# Patient Record
Sex: FEMALE | Race: WHITE | NOT HISPANIC OR LATINO | Employment: OTHER | URBAN - METROPOLITAN AREA
[De-identification: names, ages, dates, MRNs, and addresses within clinical notes are randomized per-mention and may not be internally consistent; named-entity substitution may affect disease eponyms.]

---

## 2024-01-12 ENCOUNTER — OFFICE VISIT (OUTPATIENT)
Dept: PRIMARY CARE | Facility: CLINIC | Age: 39
End: 2024-01-12
Payer: COMMERCIAL

## 2024-01-12 VITALS
HEART RATE: 75 BPM | HEIGHT: 67 IN | DIASTOLIC BLOOD PRESSURE: 66 MMHG | BODY MASS INDEX: 21.66 KG/M2 | OXYGEN SATURATION: 97 % | WEIGHT: 138 LBS | SYSTOLIC BLOOD PRESSURE: 118 MMHG

## 2024-01-12 DIAGNOSIS — T14.8XXA MUSCLE STRAIN: Primary | ICD-10-CM

## 2024-01-12 PROCEDURE — 1036F TOBACCO NON-USER: CPT | Performed by: INTERNAL MEDICINE

## 2024-01-12 PROCEDURE — 99213 OFFICE O/P EST LOW 20 MIN: CPT | Performed by: INTERNAL MEDICINE

## 2024-01-12 RX ORDER — HYDROXYZINE HYDROCHLORIDE 25 MG/1
25 TABLET, FILM COATED ORAL DAILY PRN
COMMUNITY
Start: 2023-03-09

## 2024-01-12 RX ORDER — PANTOPRAZOLE SODIUM 40 MG/1
40 TABLET, DELAYED RELEASE ORAL
COMMUNITY

## 2024-01-12 RX ORDER — OFATUMUMAB 20 MG/.4ML
20 INJECTION, SOLUTION SUBCUTANEOUS
COMMUNITY
Start: 2023-08-22

## 2024-01-12 RX ORDER — MIRTAZAPINE 15 MG/1
1 TABLET, FILM COATED ORAL NIGHTLY
COMMUNITY
Start: 2023-12-20 | End: 2024-06-07

## 2024-01-12 RX ORDER — ONDANSETRON 4 MG/1
4 TABLET, ORALLY DISINTEGRATING ORAL 3 TIMES DAILY PRN
COMMUNITY
End: 2024-05-22 | Stop reason: SDUPTHER

## 2024-01-12 RX ORDER — GABAPENTIN 800 MG/1
800 TABLET ORAL 4 TIMES DAILY
COMMUNITY

## 2024-01-12 RX ORDER — CITALOPRAM 10 MG/1
10 TABLET ORAL 3 TIMES DAILY
COMMUNITY
Start: 2023-12-28 | End: 2024-06-07

## 2024-01-12 RX ORDER — MELOXICAM 15 MG/1
15 TABLET ORAL DAILY
Qty: 30 TABLET | Refills: 0 | Status: SHIPPED | OUTPATIENT
Start: 2024-01-12 | End: 2024-05-17 | Stop reason: WASHOUT

## 2024-01-12 RX ORDER — LAMOTRIGINE 150 MG/1
150 TABLET ORAL DAILY
COMMUNITY
Start: 2023-12-05 | End: 2024-06-07 | Stop reason: ALTCHOICE

## 2024-01-12 RX ORDER — BUDESONIDE 3 MG/1
3 CAPSULE, COATED PELLETS ORAL DAILY
COMMUNITY

## 2024-01-12 ASSESSMENT — ENCOUNTER SYMPTOMS
SHORTNESS OF BREATH: 0
FATIGUE: 1
FEVER: 0
LOSS OF SENSATION IN FEET: 0
DEPRESSION: 0
OCCASIONAL FEELINGS OF UNSTEADINESS: 0
CHILLS: 0
COUGH: 0
WHEEZING: 0
PALPITATIONS: 0

## 2024-01-12 ASSESSMENT — PATIENT HEALTH QUESTIONNAIRE - PHQ9
SUM OF ALL RESPONSES TO PHQ9 QUESTIONS 1 AND 2: 0
1. LITTLE INTEREST OR PLEASURE IN DOING THINGS: NOT AT ALL
2. FEELING DOWN, DEPRESSED OR HOPELESS: NOT AT ALL

## 2024-01-12 ASSESSMENT — PAIN SCALES - GENERAL: PAINLEVEL: 8

## 2024-01-12 NOTE — PROGRESS NOTES
"Subjective   Patient ID: John Cervantes is a 38 y.o. female who presents for Adenopathy.    Swelling under left underarm for about 1.5 weeks with tenderness down into upper chest and across to R breast. No fever but has hot flashes. On no estrogen. S/P DEVEN BSO. Had covid few mths ago-remains very tired. Denies residual cough or SOB. No new meds. Does injection in leg not arm. Has had no recent vaccines    Tobacco-using vaping not cigarettes         Review of Systems   Constitutional:  Positive for fatigue. Negative for chills and fever.   Respiratory:  Negative for cough, shortness of breath and wheezing.    Cardiovascular:  Negative for palpitations and leg swelling.   Musculoskeletal:         Something always hurts       Objective   /66   Pulse 75   Ht 1.702 m (5' 7\")   Wt 62.6 kg (138 lb)   LMP  (LMP Unknown)   SpO2 97%   BMI 21.61 kg/m²     Physical Exam  Chest:      Comments: Tender under axilla, down to pectoralis muscle and part way to breast. No mass palpated  Musculoskeletal:      Comments: ROM nl and pain free R shoulder   Lymphadenopathy:      Upper Body:      Right upper body: No supraclavicular or axillary adenopathy.      Left upper body: No supraclavicular or axillary adenopathy.   Skin:     Findings: No bruising or rash.      Comments: In area of pain         Assessment/Plan   Diagnoses and all orders for this visit:  Muscle strain  -     meloxicam (Mobic) 15 mg tablet; Take 1 tablet (15 mg) by mouth once daily.    Explained the findings show a muscle strain and the treatment is anti-inflammatory. Cannot tolerate ibuprofen but has tolerated meloxicam in the past so will try that. Suggest using heat also     "

## 2024-01-24 ENCOUNTER — HOSPITAL ENCOUNTER (EMERGENCY)
Facility: HOSPITAL | Age: 39
Discharge: HOME | End: 2024-01-24
Attending: EMERGENCY MEDICINE
Payer: COMMERCIAL

## 2024-01-24 VITALS
DIASTOLIC BLOOD PRESSURE: 81 MMHG | WEIGHT: 138 LBS | HEART RATE: 83 BPM | RESPIRATION RATE: 18 BRPM | HEIGHT: 67 IN | SYSTOLIC BLOOD PRESSURE: 114 MMHG | TEMPERATURE: 99 F | OXYGEN SATURATION: 99 % | BODY MASS INDEX: 21.66 KG/M2

## 2024-01-24 DIAGNOSIS — M54.50 LUMBAR PAIN: Primary | ICD-10-CM

## 2024-01-24 DIAGNOSIS — B34.9 VIRAL SYNDROME: ICD-10-CM

## 2024-01-24 LAB
APPEARANCE UR: CLEAR
BILIRUB UR STRIP.AUTO-MCNC: NEGATIVE MG/DL
COLOR UR: NORMAL
FLUAV RNA RESP QL NAA+PROBE: NOT DETECTED
FLUBV RNA RESP QL NAA+PROBE: NOT DETECTED
GLUCOSE UR STRIP.AUTO-MCNC: NORMAL MG/DL
HCG UR QL IA.RAPID: NEGATIVE
KETONES UR STRIP.AUTO-MCNC: NEGATIVE MG/DL
LEUKOCYTE ESTERASE UR QL STRIP.AUTO: NEGATIVE
NITRITE UR QL STRIP.AUTO: NEGATIVE
PH UR STRIP.AUTO: 6.5 [PH]
PROT UR STRIP.AUTO-MCNC: NEGATIVE MG/DL
RBC # UR STRIP.AUTO: NEGATIVE /UL
SARS-COV-2 RNA RESP QL NAA+PROBE: NOT DETECTED
SP GR UR STRIP.AUTO: 1.01
UROBILINOGEN UR STRIP.AUTO-MCNC: NORMAL MG/DL

## 2024-01-24 PROCEDURE — 81003 URINALYSIS AUTO W/O SCOPE: CPT | Performed by: EMERGENCY MEDICINE

## 2024-01-24 PROCEDURE — 81025 URINE PREGNANCY TEST: CPT | Performed by: EMERGENCY MEDICINE

## 2024-01-24 PROCEDURE — 2500000001 HC RX 250 WO HCPCS SELF ADMINISTERED DRUGS (ALT 637 FOR MEDICARE OP): Performed by: EMERGENCY MEDICINE

## 2024-01-24 PROCEDURE — 87636 SARSCOV2 & INF A&B AMP PRB: CPT | Performed by: EMERGENCY MEDICINE

## 2024-01-24 PROCEDURE — 99283 EMERGENCY DEPT VISIT LOW MDM: CPT | Performed by: EMERGENCY MEDICINE

## 2024-01-24 RX ORDER — HYDROCODONE BITARTRATE AND ACETAMINOPHEN 5; 325 MG/1; MG/1
1 TABLET ORAL ONCE
Status: COMPLETED | OUTPATIENT
Start: 2024-01-24 | End: 2024-01-24

## 2024-01-24 RX ADMIN — HYDROCODONE BITARTRATE AND ACETAMINOPHEN 1 TABLET: 5; 325 TABLET ORAL at 17:25

## 2024-01-24 ASSESSMENT — PAIN SCALES - GENERAL: PAINLEVEL_OUTOF10: 0 - NO PAIN

## 2024-01-24 ASSESSMENT — COLUMBIA-SUICIDE SEVERITY RATING SCALE - C-SSRS
2. HAVE YOU ACTUALLY HAD ANY THOUGHTS OF KILLING YOURSELF?: NO
6. HAVE YOU EVER DONE ANYTHING, STARTED TO DO ANYTHING, OR PREPARED TO DO ANYTHING TO END YOUR LIFE?: NO
1. IN THE PAST MONTH, HAVE YOU WISHED YOU WERE DEAD OR WISHED YOU COULD GO TO SLEEP AND NOT WAKE UP?: NO

## 2024-01-24 ASSESSMENT — PAIN - FUNCTIONAL ASSESSMENT: PAIN_FUNCTIONAL_ASSESSMENT: 0-10

## 2024-01-24 NOTE — ED PROVIDER NOTES
HPI   Chief Complaint   Patient presents with    Flu Symptoms     Pt states she has hx of MS and has recently as 4 days ago started to develop severe muscle and body aches, chest tightness and sob. Pt states she has a productive cough with greenish and brown color.          This is a 38-year-old female with a history of multiple sclerosis who presents with 4 days of bodyaches, mild chest tightness and some shortness of breath.  The patient states she is had an associated cough with greenish-brown sputum.  The patient denies objective fever or current chest pain or shortness of breath at this time.  Patient mainly complains of lower back pain which she has had similarly with MS symptoms.                          [place] Coma Scale Score: 15                  Patient History   Past Medical History:   Diagnosis Date    Abnormal results of thyroid function studies 01/03/2017    Abnormal thyroid function test    Acute upper respiratory infection, unspecified 01/27/2017    Acute upper respiratory infection    Anxiety disorder, unspecified 06/08/2017    Acute anxiety    Diarrhea, unspecified 10/23/2017    Acute diarrhea    Encounter for follow-up examination after completed treatment for conditions other than malignant neoplasm 01/19/2017    Follow-up examination    Encounter for screening for other viral diseases 06/08/2017    Need for hepatitis C screening test    Muscle spasm of back 03/14/2017    Back muscle spasm    Other chest pain 10/23/2017    Chest tightness    Other enthesopathies, not elsewhere classified 01/03/2017    Shoulder tendinitis    Other hypertrophic disorders of the skin 01/27/2016    Skin tag    Other muscle spasm 03/14/2017    Neck muscle spasm    Other noninflammatory disorders of ovary, fallopian tube and broad ligament 10/27/2017    Enlarged ovary    Pain in right hip 11/02/2017    Right hip pain    Pain in right shoulder 10/20/2016    Right shoulder pain    Pain in thoracic spine 09/19/2017     Thoracic back pain    Pain in unspecified knee 2016    Knee pain    Personal history of other diseases of the female genital tract 10/27/2017    History of ovarian cyst    Personal history of other diseases of the musculoskeletal system and connective tissue 2017    History of low back pain    Personal history of other diseases of the respiratory system 2017    History of acute sinusitis    Personal history of other diseases of the respiratory system 2016    History of acute sinusitis    Personal history of other diseases of the respiratory system 2014    History of sinusitis    Personal history of other specified conditions     History of nausea    Personal history of other specified conditions 10/23/2017    History of dyspnea    Personal history of urinary (tract) infections 2016    History of urinary tract infection    Personal history of urinary (tract) infections 2017    History of urinary tract infection    Pleurodynia 2017    Chest pain, pleuritic    Sebaceous cyst 2014    Infected sebaceous cyst    Unspecified perforation of tympanic membrane, right ear 2018    Perforation of right tympanic membrane     Past Surgical History:   Procedure Laterality Date     SECTION, CLASSIC  10/10/2014     Section    HYSTERECTOMY  2018    Hysterectomy    OTHER SURGICAL HISTORY  2016    Abdominal Surgery     No family history on file.  Social History     Tobacco Use    Smoking status: Former     Types: Cigarettes     Passive exposure: Never    Smokeless tobacco: Never   Vaping Use    Vaping Use: Every day   Substance Use Topics    Alcohol use: Never    Drug use: Never       Physical Exam   ED Triage Vitals [24 1421]   Temperature Heart Rate Respirations BP   37.2 °C (99 °F) 85 16 115/82      Pulse Ox Temp Source Heart Rate Source Patient Position   98 % Oral Monitor Sitting      BP Location FiO2 (%)     Left arm --       Physical  Exam  Vitals and nursing note reviewed.   Constitutional:       General: She is not in acute distress.     Appearance: She is well-developed.   HENT:      Head: Normocephalic and atraumatic.   Eyes:      Conjunctiva/sclera: Conjunctivae normal.   Neck:      Comments: Mild tenderness to the paralumbar musculature  Cardiovascular:      Rate and Rhythm: Normal rate and regular rhythm.      Heart sounds: No murmur heard.  Pulmonary:      Effort: Pulmonary effort is normal. No respiratory distress.      Breath sounds: Normal breath sounds.   Abdominal:      Palpations: Abdomen is soft.      Tenderness: There is no abdominal tenderness.   Musculoskeletal:         General: No swelling.      Cervical back: Neck supple.   Skin:     General: Skin is warm and dry.      Capillary Refill: Capillary refill takes less than 2 seconds.   Neurological:      Mental Status: She is alert.   Psychiatric:         Mood and Affect: Mood normal.         ED Course & MDM   Diagnoses as of 01/24/24 2025   Lumbar pain   Viral syndrome       Medical Decision Making  This is a 38-year-old female with a history of multiple sclerosis who presents with 4 days of bodyaches, mild chest tightness and some shortness of breath.  The patient states she is had an associated cough with greenish-brown sputum.  The patient denies objective fever or current chest pain or shortness of breath at this time.  Patient mainly complains of lower back pain which she has had similarly with MS symptoms.    Differential diagnosis for increased cough and congestion (daughter is presenting with similar symptoms x 24 hours) includes viral etiologies such as COVID or flu.  The patient does have some lower back pain and history of UTI so I will check a urinalysis and a pregnancy test as well.    COVID flu were negative  Urinalysis is negative  Pregnancy was negative  Patient will be discharged home she was given 1 Norco 5/3/2025 prior to discharge.  The patient was encouraged  to follow-up/call her primary care provider tomorrow morning to arrange a follow-up appointment.  I did try to reach out to your primary care provider however office hours is closed and there is no immediate ability to contact her.          Procedure  Procedures     Abe Brunson, DO  01/24/24 2026       Abe Brunson, DO  01/24/24 2027

## 2024-02-05 ENCOUNTER — DOCUMENTATION (OUTPATIENT)
Dept: PRIMARY CARE | Facility: CLINIC | Age: 39
End: 2024-02-05
Payer: COMMERCIAL

## 2024-05-13 ENCOUNTER — APPOINTMENT (OUTPATIENT)
Dept: RADIOLOGY | Facility: HOSPITAL | Age: 39
End: 2024-05-13
Payer: COMMERCIAL

## 2024-05-13 ENCOUNTER — APPOINTMENT (OUTPATIENT)
Dept: CARDIOLOGY | Facility: HOSPITAL | Age: 39
End: 2024-05-13
Payer: COMMERCIAL

## 2024-05-13 ENCOUNTER — HOSPITAL ENCOUNTER (EMERGENCY)
Facility: HOSPITAL | Age: 39
Discharge: HOME | End: 2024-05-13
Attending: STUDENT IN AN ORGANIZED HEALTH CARE EDUCATION/TRAINING PROGRAM
Payer: COMMERCIAL

## 2024-05-13 VITALS
OXYGEN SATURATION: 97 % | RESPIRATION RATE: 18 BRPM | SYSTOLIC BLOOD PRESSURE: 119 MMHG | TEMPERATURE: 99 F | HEART RATE: 64 BPM | DIASTOLIC BLOOD PRESSURE: 76 MMHG | HEIGHT: 67 IN | WEIGHT: 144.62 LBS | BODY MASS INDEX: 22.7 KG/M2

## 2024-05-13 DIAGNOSIS — M54.10 RADICULAR PAIN: ICD-10-CM

## 2024-05-13 DIAGNOSIS — R07.89 CHEST WALL PAIN: Primary | ICD-10-CM

## 2024-05-13 LAB
ALBUMIN SERPL-MCNC: 4.7 G/DL (ref 3.5–5)
ALP BLD-CCNC: 81 U/L (ref 35–125)
ALT SERPL-CCNC: 15 U/L (ref 5–40)
ANION GAP SERPL CALC-SCNC: 10 MMOL/L
AST SERPL-CCNC: 14 U/L (ref 5–40)
BASOPHILS # BLD AUTO: 0.02 X10*3/UL (ref 0–0.1)
BASOPHILS NFR BLD AUTO: 0.3 %
BILIRUB SERPL-MCNC: 0.2 MG/DL (ref 0.1–1.2)
BUN SERPL-MCNC: 8 MG/DL (ref 8–25)
CALCIUM SERPL-MCNC: 9.5 MG/DL (ref 8.5–10.4)
CHLORIDE SERPL-SCNC: 105 MMOL/L (ref 97–107)
CO2 SERPL-SCNC: 28 MMOL/L (ref 24–31)
CREAT SERPL-MCNC: 0.7 MG/DL (ref 0.4–1.6)
D DIMER PPP FEU-MCNC: 0.19 MG/L FEU (ref 0.19–0.5)
EGFRCR SERPLBLD CKD-EPI 2021: >90 ML/MIN/1.73M*2
EOSINOPHIL # BLD AUTO: 0 X10*3/UL (ref 0–0.7)
EOSINOPHIL NFR BLD AUTO: 0 %
ERYTHROCYTE [DISTWIDTH] IN BLOOD BY AUTOMATED COUNT: 13.2 % (ref 11.5–14.5)
GLUCOSE SERPL-MCNC: 91 MG/DL (ref 65–99)
HCG SERPL-ACNC: 2 MIU/ML
HCT VFR BLD AUTO: 40.6 % (ref 36–46)
HGB BLD-MCNC: 13.5 G/DL (ref 12–16)
IMM GRANULOCYTES # BLD AUTO: 0.01 X10*3/UL (ref 0–0.7)
IMM GRANULOCYTES NFR BLD AUTO: 0.2 % (ref 0–0.9)
LYMPHOCYTES # BLD AUTO: 2.04 X10*3/UL (ref 1.2–4.8)
LYMPHOCYTES NFR BLD AUTO: 32.4 %
MCH RBC QN AUTO: 31.5 PG (ref 26–34)
MCHC RBC AUTO-ENTMCNC: 33.3 G/DL (ref 32–36)
MCV RBC AUTO: 95 FL (ref 80–100)
MONOCYTES # BLD AUTO: 0.56 X10*3/UL (ref 0.1–1)
MONOCYTES NFR BLD AUTO: 8.9 %
NEUTROPHILS # BLD AUTO: 3.67 X10*3/UL (ref 1.2–7.7)
NEUTROPHILS NFR BLD AUTO: 58.2 %
NRBC BLD-RTO: 0 /100 WBCS (ref 0–0)
PLATELET # BLD AUTO: 255 X10*3/UL (ref 150–450)
POTASSIUM SERPL-SCNC: 4.1 MMOL/L (ref 3.4–5.1)
PROT SERPL-MCNC: 6.8 G/DL (ref 5.9–7.9)
RBC # BLD AUTO: 4.29 X10*6/UL (ref 4–5.2)
SODIUM SERPL-SCNC: 143 MMOL/L (ref 133–145)
TROPONIN T SERPL-MCNC: <6 NG/L
WBC # BLD AUTO: 6.3 X10*3/UL (ref 4.4–11.3)

## 2024-05-13 PROCEDURE — 2500000004 HC RX 250 GENERAL PHARMACY W/ HCPCS (ALT 636 FOR OP/ED): Performed by: STUDENT IN AN ORGANIZED HEALTH CARE EDUCATION/TRAINING PROGRAM

## 2024-05-13 PROCEDURE — 93005 ELECTROCARDIOGRAM TRACING: CPT

## 2024-05-13 PROCEDURE — 71101 X-RAY EXAM UNILAT RIBS/CHEST: CPT | Mod: LEFT SIDE | Performed by: RADIOLOGY

## 2024-05-13 PROCEDURE — 99284 EMERGENCY DEPT VISIT MOD MDM: CPT | Mod: 25

## 2024-05-13 PROCEDURE — 36415 COLL VENOUS BLD VENIPUNCTURE: CPT | Performed by: STUDENT IN AN ORGANIZED HEALTH CARE EDUCATION/TRAINING PROGRAM

## 2024-05-13 PROCEDURE — 80053 COMPREHEN METABOLIC PANEL: CPT | Performed by: STUDENT IN AN ORGANIZED HEALTH CARE EDUCATION/TRAINING PROGRAM

## 2024-05-13 PROCEDURE — 84484 ASSAY OF TROPONIN QUANT: CPT | Performed by: STUDENT IN AN ORGANIZED HEALTH CARE EDUCATION/TRAINING PROGRAM

## 2024-05-13 PROCEDURE — 96375 TX/PRO/DX INJ NEW DRUG ADDON: CPT

## 2024-05-13 PROCEDURE — 96374 THER/PROPH/DIAG INJ IV PUSH: CPT

## 2024-05-13 PROCEDURE — 71101 X-RAY EXAM UNILAT RIBS/CHEST: CPT | Mod: LT

## 2024-05-13 PROCEDURE — 85025 COMPLETE CBC W/AUTO DIFF WBC: CPT | Performed by: STUDENT IN AN ORGANIZED HEALTH CARE EDUCATION/TRAINING PROGRAM

## 2024-05-13 PROCEDURE — 85300 ANTITHROMBIN III ACTIVITY: CPT | Performed by: STUDENT IN AN ORGANIZED HEALTH CARE EDUCATION/TRAINING PROGRAM

## 2024-05-13 PROCEDURE — 84702 CHORIONIC GONADOTROPIN TEST: CPT | Performed by: STUDENT IN AN ORGANIZED HEALTH CARE EDUCATION/TRAINING PROGRAM

## 2024-05-13 RX ORDER — PREDNISONE 50 MG/1
50 TABLET ORAL DAILY
Qty: 7 TABLET | Refills: 0 | Status: SHIPPED | OUTPATIENT
Start: 2024-05-13 | End: 2024-05-20

## 2024-05-13 RX ORDER — LIDOCAINE 50 MG/G
1 PATCH TOPICAL DAILY
Qty: 10 PATCH | Refills: 0 | Status: SHIPPED | OUTPATIENT
Start: 2024-05-13 | End: 2024-05-13

## 2024-05-13 RX ORDER — FENTANYL CITRATE 50 UG/ML
100 INJECTION, SOLUTION INTRAMUSCULAR; INTRAVENOUS ONCE
Status: COMPLETED | OUTPATIENT
Start: 2024-05-13 | End: 2024-05-13

## 2024-05-13 RX ORDER — OXYCODONE HYDROCHLORIDE 5 MG/1
5 TABLET ORAL EVERY 6 HOURS PRN
Qty: 8 TABLET | Refills: 0 | Status: SHIPPED | OUTPATIENT
Start: 2024-05-13 | End: 2024-05-17

## 2024-05-13 RX ORDER — KETOROLAC TROMETHAMINE 30 MG/ML
15 INJECTION, SOLUTION INTRAMUSCULAR; INTRAVENOUS ONCE
Status: COMPLETED | OUTPATIENT
Start: 2024-05-13 | End: 2024-05-13

## 2024-05-13 RX ORDER — MORPHINE SULFATE 4 MG/ML
4 INJECTION, SOLUTION INTRAMUSCULAR; INTRAVENOUS ONCE
Status: COMPLETED | OUTPATIENT
Start: 2024-05-13 | End: 2024-05-13

## 2024-05-13 RX ORDER — LIDOCAINE 50 MG/G
1 PATCH TOPICAL DAILY
Qty: 10 PATCH | Refills: 0 | Status: SHIPPED | OUTPATIENT
Start: 2024-05-13

## 2024-05-13 RX ADMIN — MORPHINE SULFATE 4 MG: 4 INJECTION, SOLUTION INTRAMUSCULAR; INTRAVENOUS at 12:15

## 2024-05-13 RX ADMIN — FENTANYL CITRATE 100 MCG: 50 INJECTION INTRAMUSCULAR; INTRAVENOUS at 11:33

## 2024-05-13 RX ADMIN — KETOROLAC TROMETHAMINE 15 MG: 30 INJECTION, SOLUTION INTRAMUSCULAR at 11:24

## 2024-05-13 ASSESSMENT — PAIN SCALES - GENERAL
PAINLEVEL_OUTOF10: 10 - WORST POSSIBLE PAIN
PAINLEVEL_OUTOF10: 7
PAINLEVEL_OUTOF10: 9

## 2024-05-13 ASSESSMENT — HEART SCORE
HEART SCORE: 1
AGE: <45
ECG: NON-SPECIFIC REPOLARIZATION DISTURBANCE
TROPONIN: LESS THAN OR EQUAL TO NORMAL LIMIT
HISTORY: SLIGHTLY SUSPICIOUS
RISK FACTORS: NO KNOWN RISK FACTORS

## 2024-05-13 ASSESSMENT — COLUMBIA-SUICIDE SEVERITY RATING SCALE - C-SSRS
1. IN THE PAST MONTH, HAVE YOU WISHED YOU WERE DEAD OR WISHED YOU COULD GO TO SLEEP AND NOT WAKE UP?: NO
2. HAVE YOU ACTUALLY HAD ANY THOUGHTS OF KILLING YOURSELF?: NO
6. HAVE YOU EVER DONE ANYTHING, STARTED TO DO ANYTHING, OR PREPARED TO DO ANYTHING TO END YOUR LIFE?: NO

## 2024-05-13 ASSESSMENT — PAIN - FUNCTIONAL ASSESSMENT: PAIN_FUNCTIONAL_ASSESSMENT: 0-10

## 2024-05-13 ASSESSMENT — PAIN DESCRIPTION - PAIN TYPE: TYPE: ACUTE PAIN

## 2024-05-13 ASSESSMENT — PAIN DESCRIPTION - LOCATION: LOCATION: RIB CAGE

## 2024-05-13 ASSESSMENT — PAIN DESCRIPTION - ONSET: ONSET: SUDDEN

## 2024-05-13 ASSESSMENT — PAIN DESCRIPTION - FREQUENCY: FREQUENCY: CONSTANT/CONTINUOUS

## 2024-05-13 ASSESSMENT — PAIN DESCRIPTION - PROGRESSION: CLINICAL_PROGRESSION: NOT CHANGED

## 2024-05-13 ASSESSMENT — PAIN DESCRIPTION - ORIENTATION: ORIENTATION: LEFT

## 2024-05-13 NOTE — DISCHARGE INSTRUCTIONS
Follow up with your primary care physician in the next 24-48 hours to schedule a follow up appointment. Return to the ED for any new or concerning symptoms including but not limited to chest pain with associated shortness of breath, sweating, chest pain that radiates down the arm or into the back or the neck, chest pain that gets worse with exertion and better with rest, or severe worsening of your symptoms.     Based on the HEART score for risk stratification and chest pain patient's your estimated risk for a major adverse cardiac event in the next 6 weeks is >1.8% based on your medical history, family history, and other risk factors as well as her results today.  Your findings today do not mean that you do not have heart disease, simply that you are at low risk for major complication in the next 6 weeks and there was no evidence of heart attack/acute coronary syndrome today on your visit to the ED.    Start taking prednisone 50 mg a day for the next 7 days for treatment of your nerve pain.  You can also use lidocaine patches over the area for pain relief in addition to Tylenol.

## 2024-05-13 NOTE — ED PROVIDER NOTES
HPI   Chief Complaint   Patient presents with    Chest Pain     For the past month and 1/2 I have had achy sore shooting pain into my lt hip and lt ribs  I feel pressue       This is a 39-year-old female with a past medical history of chronic pain who is currently in pain management, MS, cervical spinal fusion, depression, kidney stones presenting to the ED for evaluation of left-sided chest wall pain.  She states that this has been present for a month and a half now and is worse when she takes deep breath, worse with bending and twisting movements of the torso.  She feels that her pain is worse than it has been in the past so she came to the ED today for evaluation and management.  She does take gabapentin and has been using anti-inflammatories at home without sufficient relief of her pain.  She has the pain does wrap around to underneath the breast on the left side of the chest wall and she feels that it starts at the anterior ribs and radiates into the back rather than the other way around.  She denies any rash to the area, any cough or congestion, fevers, recent injections to the area.      History provided by:  Patient and medical records   used: No                        [place] Coma Scale Score: 15   HEART Score: 1                   Patient History   Past Medical History:   Diagnosis Date    Abnormal results of thyroid function studies 01/03/2017    Abnormal thyroid function test    Acute upper respiratory infection, unspecified 01/27/2017    Acute upper respiratory infection    Anxiety disorder, unspecified 06/08/2017    Acute anxiety    Diarrhea, unspecified 10/23/2017    Acute diarrhea    Encounter for follow-up examination after completed treatment for conditions other than malignant neoplasm 01/19/2017    Follow-up examination    Encounter for screening for other viral diseases 06/08/2017    Need for hepatitis C screening test    Muscle spasm of back 03/14/2017    Back muscle spasm     Other chest pain 10/23/2017    Chest tightness    Other enthesopathies, not elsewhere classified 2017    Shoulder tendinitis    Other hypertrophic disorders of the skin 2016    Skin tag    Other muscle spasm 2017    Neck muscle spasm    Other noninflammatory disorders of ovary, fallopian tube and broad ligament 10/27/2017    Enlarged ovary    Pain in right hip 2017    Right hip pain    Pain in right shoulder 10/20/2016    Right shoulder pain    Pain in thoracic spine 2017    Thoracic back pain    Pain in unspecified knee 2016    Knee pain    Personal history of other diseases of the female genital tract 10/27/2017    History of ovarian cyst    Personal history of other diseases of the musculoskeletal system and connective tissue 2017    History of low back pain    Personal history of other diseases of the respiratory system 2017    History of acute sinusitis    Personal history of other diseases of the respiratory system 2016    History of acute sinusitis    Personal history of other diseases of the respiratory system 2014    History of sinusitis    Personal history of other specified conditions     History of nausea    Personal history of other specified conditions 10/23/2017    History of dyspnea    Personal history of urinary (tract) infections 2016    History of urinary tract infection    Personal history of urinary (tract) infections 2017    History of urinary tract infection    Pleurodynia 2017    Chest pain, pleuritic    Sebaceous cyst 2014    Infected sebaceous cyst    Unspecified perforation of tympanic membrane, right ear 2018    Perforation of right tympanic membrane     Past Surgical History:   Procedure Laterality Date     SECTION, CLASSIC  10/10/2014     Section    HYSTERECTOMY  2018    Hysterectomy    OTHER SURGICAL HISTORY  2016    Abdominal Surgery     No family history on  file.  Social History     Tobacco Use    Smoking status: Former     Types: Cigarettes     Passive exposure: Never    Smokeless tobacco: Never   Vaping Use    Vaping status: Every Day   Substance Use Topics    Alcohol use: Never    Drug use: Never       Physical Exam   ED Triage Vitals [05/13/24 1009]   Temperature Heart Rate Respirations BP   37.2 °C (99 °F) 77 18 106/72      Pulse Ox Temp Source Heart Rate Source Patient Position   98 % Oral Monitor Sitting      BP Location FiO2 (%)     Left arm --       Physical Exam  General: well developed, thin adult female who is awake and alert, uncomfortable appearing, daughter at bedside  Eyes: sclera clear bilaterally  CV: regular rate and rhythm, no murmur, no gallops, or rubs. radial pulses +2/4 bilaterally  Resp: clear to ascultation bilaterally, no wheezes, rales, or rhonchi  GI: abdomen soft, nontender without rigidity or guarding, no peritoneal signs, abdomen is nondistended, no masses palpated  MSK: No midline spinal tenderness, she is tender to the chest wall around the lower ribs on the left side without palpable deformity, no bruising.  Strength +5/5 to upper and lower extremities.  No swelling visualized.  Bilaterally, no swelling of the extremities.  Neuro: Sensation fully intact to all extremities  Psych: Patient is anxious appearing  Skin: warm, dry, without evidence of rash or abrasions    ED Course & MDM   ED Course as of 05/13/24 1212   Mon May 13, 2024   1024 GI interpretation shows normal sinus rhythm, rate of 72 beats minute.  Normal axis.  QTc is 396 ms, MT interval 158.  No ST elevation or depression, no acute ischemic pattern.  No STEMI.  There are T wave inversions in leads V3 through V6, nonspecific findings. [NT]      ED Course User Index  [NT] Shane Bueno DO         Diagnoses as of 05/13/24 1212   Chest wall pain   Radicular pain       Medical Decision Making  PMH: Chronic pain, MS, cervical spinal fusion, MS, kidney stones  History  obtained: directly from patient, review of her most recent outpatient pain management note  Social factors affecting disposition: none    Patient is uncomfortable appearing in the emergency department, complaining of pain wrapping around the anterior ribs across the left side of her chest underneath the left breast.  This has been ongoing for over a month and she has not had any relief from her medication at home.  She denies any trauma or injury preceding this, any fevers or chills or signs of infection.  She has no rash over the area to suggest shingles.  She does have tenderness to light touch over the skin in the affected area.  I suspect this is more radicular pain.  Cardiac workup with D-dimer ordered to exclude ACS, PE, evidence of pneumonia, infection, rib injury on x-ray all of which were unremarkable.  EKG shows no acute ischemic pattern.  Heart score is 1 estimating low risk for major adverse cardiac event in the next 3 months.  She is neurovascular intact on exam, she has equal palpable pulses in the upper extremities, negative D-dimer which has high sensitivity for acute aortic pathology as well.  I have low suspicion for aortic dissection given these findings and the fact that her pain is unchanged and persistent for a month and a half, I would expect for an aortic dissection to worsen and progress in that amount of time.    She was given Toradol with minimal relief of her pain.  I reassessed the patient and she is asking for something stronger.  She did receive IV fentanyl which did improve her pain somewhat.  When reviewing the patient's results she is stating that she has everything that I am trying to prescribe to her at home except for the prednisone and is asking for something stronger for pain.  She is in pain management as an outpatient, I do not feel comfortable prescribing narcotic pain medication for her as an outpatient given that she is in pain management and this may breach her pain  management contract.  She is follow-up with a pain management provider regarding her worsening of her chronic pain for further outpatient management.  She was started on prednisone for treatment of presumed radicular pain, she was advised to continue using gabapentin at home as well as muscle relaxers, Tylenol, ice and heat to the area.    Return precautions were discussed including signs of infection, neurologic deficits which would require reassessment immediately.      XR ribs 2 views left w chest pa or ap   Final Result   1.  No displaced fracture identified.   2. No focal infiltrate or pneumothorax identified.        MACRO:   None        Signed by: Riky Chairez 5/13/2024 11:32 AM   Dictation workstation:   [id number]        Labs Reviewed   COMPREHENSIVE METABOLIC PANEL - Normal       Result Value    Glucose 91      Sodium 143      Potassium 4.1      Chloride 105      Bicarbonate 28      Urea Nitrogen 8      Creatinine 0.70      eGFR >90      Calcium 9.5      Albumin 4.7      Alkaline Phosphatase 81      Total Protein 6.8      AST 14      Bilirubin, Total 0.2      ALT 15      Anion Gap 10     TROPONIN T, HIGH SENSITIVITY - Normal    Troponin T, High Sensitivity <6     D-DIMER, NON VTE - Normal    D-Dimer Non VTE, Quant (mg/L FEU) 0.19      Narrative:     THROMBOEMBOLIC EVENTS CANNOT BE EXCLUDED SOLELY ON THE BASIS OF THE D-DIMER LEVEL BEING WITHIN THE NORMAL REFERENCE RANGE. D-DIMER LEVELS LESS THAN 0.5 MG/L FEU IN CONJUNCTION WITH A LOW CLINICAL PROBABILITY HAVE AN EXCELLENT NEGATIVE PREDICTIVE VALUE IN EXCLUDING A DIAGNOSIS OF PULMONARY EMBOLUS (PE) OR DEEP VEIN THROMBOSIS (DVT). ELEVATED D-DIMER LEVELS ARE NOT SPECIFIC TO PE OR DVT, AND MAY BE SEEN IN PATIENTS WITH DIC, ADVANCED AGE, PREGNANCY, MALIGNANCY, LIVER DISEASE, INFECTION, AND INFLAMMATORY CONDITIONS AMONG OTHERS. D-DIMER LEVELS MAY BE DECREASED IN PATIENTS RECEIVING ANTI-COAGULATION THERAPY.   CBC WITH AUTO DIFFERENTIAL    WBC 6.3      nRBC 0.0       RBC 4.29      Hemoglobin 13.5      Hematocrit 40.6      MCV 95      MCH 31.5      MCHC 33.3      RDW 13.2      Platelets 255      Neutrophils % 58.2      Immature Granulocytes %, Automated 0.2      Lymphocytes % 32.4      Monocytes % 8.9      Eosinophils % 0.0      Basophils % 0.3      Neutrophils Absolute 3.67      Immature Granulocytes Absolute, Automated 0.01      Lymphocytes Absolute 2.04      Monocytes Absolute 0.56      Eosinophils Absolute 0.00      Basophils Absolute 0.02     HUMAN CHORIONIC GONADOTROPIN, SERUM QUANTITATIVE    HCG, Beta-Quantitative 2           Procedure  Procedures     Shane Bueno,   05/13/24 1211      The patient was insistent that she needs something more to take at home, I cannot prescribe Toradol and steroids at the same time due to increased risk of gastric irritation, ulcer formation, or perforation.  Will send a few tablets of oxycodone to use sparingly, discussed with the patient that she may get in trouble for using these from her pain management doctor and she should confirm before she does so.  She understands this.  She was discharged from the ED.     Shane Bueno DO  05/13/24 [id number]

## 2024-05-17 ENCOUNTER — OFFICE VISIT (OUTPATIENT)
Dept: PRIMARY CARE | Facility: CLINIC | Age: 39
End: 2024-05-17
Payer: COMMERCIAL

## 2024-05-17 VITALS
DIASTOLIC BLOOD PRESSURE: 70 MMHG | BODY MASS INDEX: 22.13 KG/M2 | HEART RATE: 83 BPM | HEIGHT: 67 IN | WEIGHT: 141 LBS | OXYGEN SATURATION: 97 % | RESPIRATION RATE: 16 BRPM | SYSTOLIC BLOOD PRESSURE: 130 MMHG

## 2024-05-17 DIAGNOSIS — G35 MS (MULTIPLE SCLEROSIS) (MULTI): Primary | ICD-10-CM

## 2024-05-17 DIAGNOSIS — M94.0 COSTOCHONDRITIS, ACUTE: ICD-10-CM

## 2024-05-17 DIAGNOSIS — K52.831 COLLAGENOUS COLITIS: ICD-10-CM

## 2024-05-17 PROCEDURE — 99214 OFFICE O/P EST MOD 30 MIN: CPT | Performed by: INTERNAL MEDICINE

## 2024-05-17 RX ORDER — DEXTROAMPHETAMINE SACCHARATE, AMPHETAMINE ASPARTATE, DEXTROAMPHETAMINE SULFATE AND AMPHETAMINE SULFATE 2.5; 2.5; 2.5; 2.5 MG/1; MG/1; MG/1; MG/1
10 TABLET ORAL DAILY
COMMUNITY
Start: 2024-05-13 | End: 2024-06-12

## 2024-05-17 RX ORDER — TIZANIDINE 4 MG/1
4 TABLET ORAL EVERY 6 HOURS PRN
Qty: 120 TABLET | Refills: 0 | Status: SHIPPED | OUTPATIENT
Start: 2024-05-17 | End: 2024-06-16

## 2024-05-17 RX ORDER — OXYCODONE AND ACETAMINOPHEN 5; 325 MG/1; MG/1
1 TABLET ORAL EVERY 6 HOURS PRN
Qty: 28 TABLET | Refills: 0 | Status: SHIPPED | OUTPATIENT
Start: 2024-05-17 | End: 2024-05-28 | Stop reason: SDUPTHER

## 2024-05-17 ASSESSMENT — PATIENT HEALTH QUESTIONNAIRE - PHQ9
2. FEELING DOWN, DEPRESSED OR HOPELESS: NOT AT ALL
SUM OF ALL RESPONSES TO PHQ9 QUESTIONS 1 AND 2: 0
1. LITTLE INTEREST OR PLEASURE IN DOING THINGS: NOT AT ALL

## 2024-05-17 ASSESSMENT — PAIN SCALES - GENERAL: PAINLEVEL: 10-WORST PAIN EVER

## 2024-05-17 ASSESSMENT — ENCOUNTER SYMPTOMS
SHORTNESS OF BREATH: 0
DIARRHEA: 0
LOSS OF SENSATION IN FEET: 0
DIZZINESS: 0
OCCASIONAL FEELINGS OF UNSTEADINESS: 0
COUGH: 0
ROS GI COMMENTS: ACID REFLUX
PALPITATIONS: 0
ABDOMINAL PAIN: 1
DEPRESSION: 0
ARTHRALGIAS: 0
FLANK PAIN: 1
BACK PAIN: 1
NAUSEA: 0
CONSTIPATION: 0

## 2024-05-17 NOTE — PROGRESS NOTES
"Subjective   Patient ID: John Cervantes is a 39 y.o. female who presents for follow up after ED visit on 5/13. Patient is not feeling well. She reports LUQ abdominal pain that began a few months ago. It worsened this weekend. Four days ago, the pain started to radiate across her entire L side and back. She reported to ED that day and has been taking prednisone 50 mg. It hurts when she moves, breaths, coughs, laughs. Was given oxycodone but it does not take the pain away completely. She also uses lidocaine patches, heat, ice. Interested in going to physical therapy. Takes pantoprazole for acid reflux.    Diagnostics Reviewed: 5/2024 XR ribs was nml. 2/2024 MRI brain showed  Labs Reviewed: 5/13/2024 CMP nml, CBC nml.         Review of Systems   Respiratory:  Negative for cough and shortness of breath.    Cardiovascular:  Negative for chest pain and palpitations.   Gastrointestinal:  Positive for abdominal pain. Negative for constipation, diarrhea and nausea.        Acid reflux   Genitourinary:  Positive for flank pain (L).   Musculoskeletal:  Positive for back pain. Negative for arthralgias.   Neurological:  Negative for dizziness.       Objective   /70   Pulse 83   Resp 16   Ht 1.702 m (5' 7\")   Wt 64 kg (141 lb)   LMP  (LMP Unknown)   SpO2 97%   BMI 22.08 kg/m²     Physical Exam  Cardiovascular:      Rate and Rhythm: Normal rate and regular rhythm.      Heart sounds: Normal heart sounds.   Pulmonary:      Breath sounds: Normal breath sounds.   Abdominal:      Palpations: Abdomen is soft. There is no hepatomegaly.      Tenderness: There is no abdominal tenderness.   Musculoskeletal:         General: Tenderness (L flank) present.      Right lower leg: No edema.      Left lower leg: No edema.   Neurological:      Mental Status: She is alert and oriented to person, place, and time.      Gait: Gait normal.   Psychiatric:         Mood and Affect: Mood normal.         Behavior: Behavior normal. "         Assessment/Plan   Diagnoses and all orders for this visit:  MS (multiple sclerosis) (Multi)  -     tiZANidine (Zanaflex) 4 mg tablet; Take 1 tablet (4 mg) by mouth every 6 hours if needed for muscle spasms.  -     Referral to Physical Therapy; Future  -     oxyCODONE-acetaminophen (Percocet) 5-325 mg tablet; Take 1 tablet by mouth every 6 hours if needed for severe pain (7 - 10) for up to 7 days.  Collagenous colitis  -     tiZANidine (Zanaflex) 4 mg tablet; Take 1 tablet (4 mg) by mouth every 6 hours if needed for muscle spasms.  -     Referral to Physical Therapy; Future  -     oxyCODONE-acetaminophen (Percocet) 5-325 mg tablet; Take 1 tablet by mouth every 6 hours if needed for severe pain (7 - 10) for up to 7 days.  Costochondritis, acute  -     tiZANidine (Zanaflex) 4 mg tablet; Take 1 tablet (4 mg) by mouth every 6 hours if needed for muscle spasms.  -     Referral to Physical Therapy; Future  -     oxyCODONE-acetaminophen (Percocet) 5-325 mg tablet; Take 1 tablet by mouth every 6 hours if needed for severe pain (7 - 10) for up to 7 days.      Scribe Attestation  By signing my name below, I, Zully Hendricks, Scrib[employer]un   attest that this documentation has been prepared under the direction and in the presence of Malia Nathan MD.

## 2024-05-22 DIAGNOSIS — M54.10 RADICULAR PAIN: ICD-10-CM

## 2024-05-22 DIAGNOSIS — R11.2 NAUSEA AND VOMITING, UNSPECIFIED VOMITING TYPE: ICD-10-CM

## 2024-05-22 RX ORDER — ONDANSETRON 4 MG/1
4 TABLET, ORALLY DISINTEGRATING ORAL 3 TIMES DAILY PRN
Qty: 90 TABLET | Refills: 3 | Status: SHIPPED | OUTPATIENT
Start: 2024-05-22

## 2024-05-22 RX ORDER — OXYCODONE HYDROCHLORIDE 5 MG/1
5 TABLET ORAL EVERY 6 HOURS PRN
Qty: 8 TABLET | Refills: 0 | Status: CANCELLED | OUTPATIENT
Start: 2024-05-22 | End: 2024-05-25

## 2024-05-23 ENCOUNTER — EVALUATION (OUTPATIENT)
Dept: PHYSICAL THERAPY | Facility: CLINIC | Age: 39
End: 2024-05-23
Payer: COMMERCIAL

## 2024-05-23 DIAGNOSIS — M94.0 COSTOCHONDRITIS, ACUTE: ICD-10-CM

## 2024-05-23 DIAGNOSIS — R07.81 RIB PAIN ON LEFT SIDE: Primary | ICD-10-CM

## 2024-05-23 DIAGNOSIS — K52.831 COLLAGENOUS COLITIS: ICD-10-CM

## 2024-05-23 DIAGNOSIS — G35 MS (MULTIPLE SCLEROSIS) (MULTI): ICD-10-CM

## 2024-05-23 LAB
ATRIAL RATE: 72 BPM
P AXIS: 64 DEGREES
P OFFSET: 201 MS
P ONSET: 143 MS
PR INTERVAL: 158 MS
Q ONSET: 222 MS
QRS COUNT: 12 BEATS
QRS DURATION: 90 MS
QT INTERVAL: 362 MS
QTC CALCULATION(BAZETT): 396 MS
QTC FREDERICIA: 384 MS
R AXIS: 62 DEGREES
T AXIS: -6 DEGREES
T OFFSET: 403 MS
VENTRICULAR RATE: 72 BPM

## 2024-05-23 PROCEDURE — 97162 PT EVAL MOD COMPLEX 30 MIN: CPT | Mod: GP

## 2024-05-23 PROCEDURE — 97112 NEUROMUSCULAR REEDUCATION: CPT | Mod: GP

## 2024-05-23 ASSESSMENT — ENCOUNTER SYMPTOMS
DEPRESSION: 1
LOSS OF SENSATION IN FEET: 0
OCCASIONAL FEELINGS OF UNSTEADINESS: 1

## 2024-05-23 NOTE — PROGRESS NOTES
"Physical Therapy Examination and Treatment Note    Patient Name: John Cervantes  MRN Number: [id number]  Initial Examination Date: 5/23/24  Referring Provider: Malia Nathan MD   Evaluating Clinician: REX Thomas PT    Today's Date: 5/23/2024  Time Calculation  Start Time: 0115  Stop Time: 0215  Time Calculation (min): 60 min    INSURANCE:  Visit Number: 1  Approved Visits: [place]  Insurance Info: ANTH X5H - NO AUTH / [place] VISITS / DEDUCT $550 - $209 met / 90% COVERAGE / OOP $2200 - $1772 met / AVAILITY [id number] / ds 5/22/24.     PRECAUTIONS/SPECIAL CONCERNS/RELEVANT PMHX: Chronic Pain, MS, cervical spine fusion, depression,     PT CLINICAL PROBLEM: Per PCP referral note 5/17/24 left flank/LBP per ER note 5/13/24 left sided chest wall pain, per pain management note 2/6/24 LBP/midback pain    Chief Medical Dx code: M94.0 (ICD-10-CM) - Costochondritis, acute G35 (ICD-10-CM) - MS (multiple sclerosis) (Multi)   1. Rib pain on left side  Follow Up In Physical Therapy          SUBJECTIVE: Left anterolateral rib/chest wall \"the past couple of months\" and the \"past 2 weeks it got a lot worse\" Went to ER and later PCP prednisone pain meds. Worse to breath cough sneeze \"expand the ribs or slouch or arch\" Also feels worse to touch. She has also tried heat, cold, tylenol. Reports taking gabapentin for MS. Onset insidious and atraumatic. Mother of 3 children. She is on disability. Is active with her home and children and taking care of family. Goals \"get rid of the pain and loosen it some\"     TREATMENT:  Symptoms/NPRS before Rx: 6  Symptoms/NPRS after Rx: 6    Timed Codes: (# minutes per modality and 0 if omitted)  TE:    Manual:   Stim:  NMRE-ED: 14  Gait:  Traction:    Box breathing 4 2 4 2 in hold out hold  Diaphragmatic breathing 3 in 6 out belly rise and belly fall pursed lip option and drawing belly in for transversus contraction  Positive self talk re-framing symptoms \"I am okay\" \"I am alright\" and so on during slow " "breathing with relaxation/calming thoughts for pain perceptual control  Advised 10 breaths 3 times per day  Pt. Was considering TENS and theragun but advised against TENS as location overlies and very close to the heart and theragun is too vigorous on this area of the body and this type of problem      OBJECTIVE:  Date: 5/23/24  MOHITADI Fall Risk: 7 (score of 4+ indicates fall risk)   OUTCOME TOOL: PSFS 70%  Right rotation inc  Right side bend inc  Left rotation ok   Left side bend ok  FB > 50  Ext > 30 but increases \"expansion\"  Symmetric ribs side to side comparison  Level pelvis  Tender 9-10 ribs anteriorly and laterally  Worse with expansion  Non tender diaphragm insertions  Very tender superficially chest wall but not to structure (intercostal, rib)  Steady gait no devices in and out of clinic moving freely not slow not labored at present today  No clear rib restrictions or shingling or over-riding ribs      ASSESSMENT: Left sided anterolateral chest wall rib area pain. Does not seem related to GI temporally or with any sort of GI/colon spasm. Is reproduced with rib expansion globally and tension as in right rotation and right side bending. Not worse with abdominal/transversus contraction. Symmetric thoracic spine alignment but not movement related to symptoms. Rib xrays are normal and on file. Pt. May benefit initially from PT for diaphragmatic breathing controlled expansion and contraction exercises in conjunction with slow breathing and even resistive rib breathing. If that does not help we will try some manual techniques on her next session. Pt. Tolerated today's visit well and she plans to try some of what we went over and come back in 3 weeks upon my return to the clinic.      Patient presents with int tissue reactivity,  int condition irritability, and int subject reactivity with impairments noted below and decreased level of functional abilities and would benefit from skilled PT to address limitations " and achieve goals noted below.     PLAN: PT to left anterolateral ribs for pain including deep breathing, resistive rib expansion, diaphragmatic, abdominal exercises for attachment sites to 9-10 ribs including transversus, and manual to assess and/or Rx for any possible intercostal spasm.      Patient/parent/caregiver agreed and consented to plan of care for skilled physical therapy at 1 times per week for 10 weeks. Physical Therapy to include modalities prn as indicated, therapeutic exercise, manual therapy, neuromuscular re-education, gait and functional performance training, instruction and practice in a home exercise program (HEP) and self-management skills.     CARE PLAN/GOALS: (met, progressing, not progressing)    STG's: (    6  weeks /      visits )  1   2 able to side bend fully to the right and rotate to the right without symptoms    LTG's  (      weeks /      visits )  1 able to 12 fully expand the ribs with full exhalation and inhalation without symptoms  2 decrease symptoms to < 4/10 and no longer constant  3 The Global Rating of Change Score (GROC) will improve to 5/7 =  “a good deal better” or more.   4 Improve functional outcome tool score (FOTS: RONALD, NDI, ASES, ABC, etc.) by > 10 points for Minimally Important Clinical Difference (MICD).  5 Patient/client will be independent with a HEP and self-management skills to further enhance recovery and progress.  6 Patient/client will verbalize >75% symptom reduction for frequency and severity of symptoms and/or > two point reduction of VAS/NPRS.  7 The Patient Specific Functional Scale metric (PSFS) will improve from baseline value to greater than 80% functional.

## 2024-05-28 DIAGNOSIS — M94.0 COSTOCHONDRITIS, ACUTE: ICD-10-CM

## 2024-05-28 DIAGNOSIS — G35 MS (MULTIPLE SCLEROSIS) (MULTI): ICD-10-CM

## 2024-05-28 DIAGNOSIS — K52.831 COLLAGENOUS COLITIS: ICD-10-CM

## 2024-05-28 RX ORDER — OXYCODONE AND ACETAMINOPHEN 5; 325 MG/1; MG/1
1 TABLET ORAL EVERY 8 HOURS PRN
Qty: 21 TABLET | Refills: 0 | Status: SHIPPED | OUTPATIENT
Start: 2024-05-28 | End: 2024-06-05 | Stop reason: SDUPTHER

## 2024-06-05 DIAGNOSIS — G35 MS (MULTIPLE SCLEROSIS) (MULTI): ICD-10-CM

## 2024-06-05 DIAGNOSIS — K52.831 COLLAGENOUS COLITIS: ICD-10-CM

## 2024-06-05 DIAGNOSIS — M94.0 COSTOCHONDRITIS, ACUTE: ICD-10-CM

## 2024-06-05 RX ORDER — OXYCODONE AND ACETAMINOPHEN 5; 325 MG/1; MG/1
1 TABLET ORAL EVERY 8 HOURS PRN
Qty: 21 TABLET | Refills: 0 | Status: SHIPPED | OUTPATIENT
Start: 2024-06-05 | End: 2024-06-12

## 2024-06-07 ENCOUNTER — OFFICE VISIT (OUTPATIENT)
Dept: PRIMARY CARE | Facility: CLINIC | Age: 39
End: 2024-06-07
Payer: COMMERCIAL

## 2024-06-07 VITALS
OXYGEN SATURATION: 97 % | HEIGHT: 67 IN | HEART RATE: 104 BPM | DIASTOLIC BLOOD PRESSURE: 70 MMHG | BODY MASS INDEX: 21.19 KG/M2 | RESPIRATION RATE: 16 BRPM | WEIGHT: 135 LBS | SYSTOLIC BLOOD PRESSURE: 112 MMHG

## 2024-06-07 DIAGNOSIS — K52.831 COLLAGENOUS COLITIS: ICD-10-CM

## 2024-06-07 DIAGNOSIS — G35 MS (MULTIPLE SCLEROSIS) (MULTI): ICD-10-CM

## 2024-06-07 DIAGNOSIS — K44.9 DIAPHRAGMATIC HERNIA WITHOUT OBSTRUCTION AND WITHOUT GANGRENE: ICD-10-CM

## 2024-06-07 DIAGNOSIS — R10.9 LEFT FLANK PAIN: Primary | ICD-10-CM

## 2024-06-07 PROCEDURE — 99214 OFFICE O/P EST MOD 30 MIN: CPT | Performed by: INTERNAL MEDICINE

## 2024-06-07 RX ORDER — LAMOTRIGINE 200 MG/1
200 TABLET ORAL DAILY
COMMUNITY
Start: 2024-05-31

## 2024-06-07 ASSESSMENT — PATIENT HEALTH QUESTIONNAIRE - PHQ9
2. FEELING DOWN, DEPRESSED OR HOPELESS: NOT AT ALL
1. LITTLE INTEREST OR PLEASURE IN DOING THINGS: NOT AT ALL
SUM OF ALL RESPONSES TO PHQ9 QUESTIONS 1 AND 2: 0

## 2024-06-07 ASSESSMENT — ENCOUNTER SYMPTOMS
CONSTIPATION: 0
SHORTNESS OF BREATH: 0
PALPITATIONS: 0
ABDOMINAL PAIN: 0
DEPRESSION: 0
DIARRHEA: 0
ARTHRALGIAS: 0
FLANK PAIN: 1
DIZZINESS: 0
LOSS OF SENSATION IN FEET: 0
OCCASIONAL FEELINGS OF UNSTEADINESS: 0
COUGH: 0
NAUSEA: 0

## 2024-06-07 ASSESSMENT — PAIN SCALES - GENERAL: PAINLEVEL: 8

## 2024-06-07 NOTE — PROGRESS NOTES
"Subjective   Patient ID: John Cervantes is a 39 y.o. female who presents for 3 week follow up. After her last visit, patient went to PT for L flank pain once. The therapist she was scheduled with went on a two week trip and has not been back since. She couldn't schedule with someone else. Her L rib pain improved slightly with the breathing exercises she was given. She reports increased stress and loss of appetite because her son recently had seizure. Since then, the L flank pain worsened and occurs with every movement. Pain is excruciating when she coughs or sneezes. Reports one episode of hearing ribs \"pop\" and feeling burning sensation across her L flank. Tried tizanidine and lidocaine patches.    Diagnostics Reviewed:  Labs Reviewed:       Review of Systems   Respiratory:  Negative for cough and shortness of breath.    Cardiovascular:  Negative for chest pain and palpitations.   Gastrointestinal:  Negative for abdominal pain, constipation, diarrhea and nausea.   Genitourinary:  Positive for flank pain (L).   Musculoskeletal:  Negative for arthralgias.   Neurological:  Negative for dizziness.       Objective   /70   Pulse 104   Resp 16   Ht 1.702 m (5' 7\")   Wt 61.2 kg (135 lb)   LMP  (LMP Unknown)   SpO2 97%   BMI 21.14 kg/m²     Physical Exam  Cardiovascular:      Rate and Rhythm: Normal rate and regular rhythm.      Heart sounds: Normal heart sounds.   Pulmonary:      Breath sounds: Normal breath sounds.   Abdominal:      Palpations: Abdomen is soft. There is no hepatomegaly.      Tenderness: There is no abdominal tenderness.   Musculoskeletal:         General: Tenderness (L flank) present.      Thoracic back: No tenderness. Normal range of motion.      Lumbar back: No tenderness. Normal range of motion.      Right lower leg: No edema.      Left lower leg: No edema.   Neurological:      Mental Status: She is alert and oriented to person, place, and time.      Gait: Gait normal.   Psychiatric:    "      Mood and Affect: Mood normal.         Behavior: Behavior normal.       Assessment/Plan   Diagnoses and all orders for this visit:  Left flank pain  Diaphragmatic hernia without obstruction and without gangrene  -     CT chest w and wo IV contrast; Future  Collagenous colitis  MS (multiple sclerosis) (Multi)      Scribe Attestation  By signing my name below, IZully Scribe   attest that this documentation has been prepared under the direction and in the presence of Malia Nathan MD.

## 2024-06-12 DIAGNOSIS — M94.0 COSTOCHONDRITIS, ACUTE: ICD-10-CM

## 2024-06-12 DIAGNOSIS — G35 MS (MULTIPLE SCLEROSIS) (MULTI): ICD-10-CM

## 2024-06-12 DIAGNOSIS — K52.831 COLLAGENOUS COLITIS: ICD-10-CM

## 2024-06-13 RX ORDER — OXYCODONE AND ACETAMINOPHEN 5; 325 MG/1; MG/1
1 TABLET ORAL EVERY 8 HOURS PRN
Qty: 21 TABLET | Refills: 0 | Status: SHIPPED | OUTPATIENT
Start: 2024-06-13 | End: 2024-06-20

## 2024-06-13 NOTE — TELEPHONE ENCOUNTER
Pt called requesting a prescription for a handicap placard if possible. Please call pt and let her know if she meets qualifications or not

## 2024-06-18 DIAGNOSIS — K52.831 COLLAGENOUS COLITIS: ICD-10-CM

## 2024-06-18 DIAGNOSIS — M94.0 COSTOCHONDRITIS, ACUTE: ICD-10-CM

## 2024-06-18 DIAGNOSIS — G35 MS (MULTIPLE SCLEROSIS) (MULTI): ICD-10-CM

## 2024-06-20 RX ORDER — OXYCODONE AND ACETAMINOPHEN 5; 325 MG/1; MG/1
1 TABLET ORAL EVERY 8 HOURS PRN
Qty: 21 TABLET | Refills: 0 | Status: SHIPPED | OUTPATIENT
Start: 2024-06-20 | End: 2024-06-27

## 2024-06-21 ENCOUNTER — HOSPITAL ENCOUNTER (OUTPATIENT)
Dept: RADIOLOGY | Facility: HOSPITAL | Age: 39
Discharge: HOME | End: 2024-06-21
Payer: COMMERCIAL

## 2024-06-21 DIAGNOSIS — K44.9 DIAPHRAGMATIC HERNIA WITHOUT OBSTRUCTION AND WITHOUT GANGRENE: ICD-10-CM

## 2024-06-21 PROCEDURE — 2550000001 HC RX 255 CONTRASTS: Performed by: INTERNAL MEDICINE

## 2024-06-21 PROCEDURE — 71260 CT THORAX DX C+: CPT

## 2024-06-23 NOTE — RESULT ENCOUNTER NOTE
CT chest looks okay except she has inflammation of the esophagus, I recommend to continue Protonix and follow-up with gastroenterology, recommend Dr. Martinez if she has not seen a gastroenterologist before

## 2024-06-24 ENCOUNTER — APPOINTMENT (OUTPATIENT)
Dept: RADIOLOGY | Facility: HOSPITAL | Age: 39
End: 2024-06-24
Payer: COMMERCIAL

## 2024-06-27 DIAGNOSIS — G35 MS (MULTIPLE SCLEROSIS) (MULTI): ICD-10-CM

## 2024-06-27 DIAGNOSIS — M94.0 COSTOCHONDRITIS, ACUTE: ICD-10-CM

## 2024-06-27 DIAGNOSIS — K52.831 COLLAGENOUS COLITIS: ICD-10-CM

## 2024-07-01 RX ORDER — OXYCODONE AND ACETAMINOPHEN 5; 325 MG/1; MG/1
1 TABLET ORAL EVERY 8 HOURS PRN
Qty: 21 TABLET | Refills: 0 | Status: SHIPPED | OUTPATIENT
Start: 2024-07-01 | End: 2024-07-08

## 2024-07-09 DIAGNOSIS — M94.0 COSTOCHONDRITIS, ACUTE: ICD-10-CM

## 2024-07-09 DIAGNOSIS — G35 MS (MULTIPLE SCLEROSIS) (MULTI): ICD-10-CM

## 2024-07-09 DIAGNOSIS — K52.831 COLLAGENOUS COLITIS: ICD-10-CM

## 2024-07-11 ENCOUNTER — LAB (OUTPATIENT)
Dept: LAB | Facility: LAB | Age: 39
End: 2024-07-11
Payer: COMMERCIAL

## 2024-07-11 ENCOUNTER — OFFICE VISIT (OUTPATIENT)
Dept: PRIMARY CARE | Facility: CLINIC | Age: 39
End: 2024-07-11
Payer: COMMERCIAL

## 2024-07-11 VITALS
WEIGHT: 137 LBS | HEART RATE: 87 BPM | RESPIRATION RATE: 16 BRPM | OXYGEN SATURATION: 97 % | HEIGHT: 67 IN | BODY MASS INDEX: 21.5 KG/M2 | SYSTOLIC BLOOD PRESSURE: 110 MMHG | DIASTOLIC BLOOD PRESSURE: 70 MMHG

## 2024-07-11 DIAGNOSIS — K52.831 COLLAGENOUS COLITIS: ICD-10-CM

## 2024-07-11 DIAGNOSIS — G43.009 MIGRAINE WITHOUT AURA AND WITHOUT STATUS MIGRAINOSUS, NOT INTRACTABLE: ICD-10-CM

## 2024-07-11 DIAGNOSIS — M94.0 COSTOCHONDRITIS, ACUTE: ICD-10-CM

## 2024-07-11 DIAGNOSIS — F33.1 MODERATE EPISODE OF RECURRENT MAJOR DEPRESSIVE DISORDER (MULTI): Primary | ICD-10-CM

## 2024-07-11 DIAGNOSIS — F33.1 MODERATE EPISODE OF RECURRENT MAJOR DEPRESSIVE DISORDER (MULTI): ICD-10-CM

## 2024-07-11 DIAGNOSIS — G35 MS (MULTIPLE SCLEROSIS) (MULTI): ICD-10-CM

## 2024-07-11 LAB
ALBUMIN SERPL-MCNC: 4.9 G/DL (ref 3.5–5)
ALP BLD-CCNC: 95 U/L (ref 35–125)
ALT SERPL-CCNC: 17 U/L (ref 5–40)
ANION GAP SERPL CALC-SCNC: 14 MMOL/L
AST SERPL-CCNC: 14 U/L (ref 5–40)
BASOPHILS # BLD AUTO: 0.03 X10*3/UL (ref 0–0.1)
BASOPHILS NFR BLD AUTO: 0.4 %
BILIRUB SERPL-MCNC: <0.2 MG/DL (ref 0.1–1.2)
BUN SERPL-MCNC: 6 MG/DL (ref 8–25)
CALCIUM SERPL-MCNC: 9.6 MG/DL (ref 8.5–10.4)
CHLORIDE SERPL-SCNC: 99 MMOL/L (ref 97–107)
CO2 SERPL-SCNC: 27 MMOL/L (ref 24–31)
CREAT SERPL-MCNC: 0.8 MG/DL (ref 0.4–1.6)
CRP SERPL-MCNC: <0.3 MG/DL (ref 0–2)
EGFRCR SERPLBLD CKD-EPI 2021: >90 ML/MIN/1.73M*2
EOSINOPHIL # BLD AUTO: 0 X10*3/UL (ref 0–0.7)
EOSINOPHIL NFR BLD AUTO: 0 %
ERYTHROCYTE [DISTWIDTH] IN BLOOD BY AUTOMATED COUNT: 13.3 % (ref 11.5–14.5)
ERYTHROCYTE [SEDIMENTATION RATE] IN BLOOD BY WESTERGREN METHOD: 7 MM/H (ref 0–20)
GLUCOSE SERPL-MCNC: 90 MG/DL (ref 65–99)
HCT VFR BLD AUTO: 42.2 % (ref 36–46)
HGB BLD-MCNC: 13.7 G/DL (ref 12–16)
IMM GRANULOCYTES # BLD AUTO: 0.01 X10*3/UL (ref 0–0.7)
IMM GRANULOCYTES NFR BLD AUTO: 0.1 % (ref 0–0.9)
LYMPHOCYTES # BLD AUTO: 1.54 X10*3/UL (ref 1.2–4.8)
LYMPHOCYTES NFR BLD AUTO: 22.9 %
MCH RBC QN AUTO: 31.1 PG (ref 26–34)
MCHC RBC AUTO-ENTMCNC: 32.5 G/DL (ref 32–36)
MCV RBC AUTO: 96 FL (ref 80–100)
MONOCYTES # BLD AUTO: 0.41 X10*3/UL (ref 0.1–1)
MONOCYTES NFR BLD AUTO: 6.1 %
NEUTROPHILS # BLD AUTO: 4.74 X10*3/UL (ref 1.2–7.7)
NEUTROPHILS NFR BLD AUTO: 70.5 %
NRBC BLD-RTO: 0 /100 WBCS (ref 0–0)
PLATELET # BLD AUTO: 299 X10*3/UL (ref 150–450)
POTASSIUM SERPL-SCNC: 4 MMOL/L (ref 3.4–5.1)
PROT SERPL-MCNC: 7.1 G/DL (ref 5.9–7.9)
RBC # BLD AUTO: 4.4 X10*6/UL (ref 4–5.2)
SODIUM SERPL-SCNC: 140 MMOL/L (ref 133–145)
URATE SERPL-MCNC: 4.2 MG/DL (ref 2.5–6.8)
VIT B12 SERPL-MCNC: 413 PG/ML (ref 211–946)
WBC # BLD AUTO: 6.7 X10*3/UL (ref 4.4–11.3)

## 2024-07-11 PROCEDURE — 81381 HLA I TYPING 1 ALLELE HR: CPT

## 2024-07-11 PROCEDURE — 86038 ANTINUCLEAR ANTIBODIES: CPT

## 2024-07-11 PROCEDURE — 80053 COMPREHEN METABOLIC PANEL: CPT

## 2024-07-11 PROCEDURE — 36415 COLL VENOUS BLD VENIPUNCTURE: CPT

## 2024-07-11 PROCEDURE — 82607 VITAMIN B-12: CPT

## 2024-07-11 PROCEDURE — 85025 COMPLETE CBC W/AUTO DIFF WBC: CPT

## 2024-07-11 PROCEDURE — 99214 OFFICE O/P EST MOD 30 MIN: CPT | Performed by: INTERNAL MEDICINE

## 2024-07-11 PROCEDURE — 86140 C-REACTIVE PROTEIN: CPT

## 2024-07-11 PROCEDURE — 84550 ASSAY OF BLOOD/URIC ACID: CPT

## 2024-07-11 PROCEDURE — 85652 RBC SED RATE AUTOMATED: CPT

## 2024-07-11 RX ORDER — DULOXETIN HYDROCHLORIDE 30 MG/1
30 CAPSULE, DELAYED RELEASE ORAL DAILY
Qty: 90 CAPSULE | Refills: 3 | Status: SHIPPED | OUTPATIENT
Start: 2024-07-11 | End: 2025-07-11

## 2024-07-11 RX ORDER — OXYCODONE AND ACETAMINOPHEN 5; 325 MG/1; MG/1
1 TABLET ORAL EVERY 8 HOURS PRN
Qty: 21 TABLET | Refills: 0 | OUTPATIENT
Start: 2024-07-11 | End: 2024-07-18

## 2024-07-11 RX ORDER — OXYCODONE AND ACETAMINOPHEN 5; 325 MG/1; MG/1
1 TABLET ORAL EVERY 8 HOURS PRN
Qty: 21 TABLET | Refills: 0 | Status: SHIPPED | OUTPATIENT
Start: 2024-07-11 | End: 2024-07-18

## 2024-07-11 ASSESSMENT — PAIN SCALES - GENERAL: PAINLEVEL: 9

## 2024-07-11 ASSESSMENT — ENCOUNTER SYMPTOMS
COUGH: 0
DIZZINESS: 0
OCCASIONAL FEELINGS OF UNSTEADINESS: 0
PALPITATIONS: 0
BACK PAIN: 1
DIARRHEA: 0
NECK PAIN: 1
ABDOMINAL PAIN: 0
ARTHRALGIAS: 1
NAUSEA: 0
SHORTNESS OF BREATH: 0
DEPRESSION: 0
HEADACHES: 1
CONSTIPATION: 0
LOSS OF SENSATION IN FEET: 0
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
## 2024-07-12 ENCOUNTER — PATIENT MESSAGE (OUTPATIENT)
Dept: PRIMARY CARE | Facility: CLINIC | Age: 39
End: 2024-07-12
Payer: COMMERCIAL

## 2024-07-12 ENCOUNTER — APPOINTMENT (OUTPATIENT)
Dept: PRIMARY CARE | Facility: CLINIC | Age: 39
End: 2024-07-12
Payer: COMMERCIAL

## 2024-07-15 LAB — ANA SER QL HEP2 SUBST: NEGATIVE

## 2024-07-16 LAB — HLAB27 TYPING: NEGATIVE

## 2024-07-26 ENCOUNTER — APPOINTMENT (OUTPATIENT)
Dept: PRIMARY CARE | Facility: CLINIC | Age: 39
End: 2024-07-26
Payer: COMMERCIAL

## 2024-08-27 ENCOUNTER — DOCUMENTATION (OUTPATIENT)
Dept: PHYSICAL THERAPY | Facility: CLINIC | Age: 39
End: 2024-08-27
Payer: COMMERCIAL

## 2024-08-27 NOTE — PROGRESS NOTES
Discharge Summary    Name: John Cervantes  MRN: [id number]  : 1985  Date: 24    Discharge Summary: PT        Rehab Discharge Reason: Other lost to follow up

## 2024-09-03 DIAGNOSIS — R11.2 NAUSEA AND VOMITING, UNSPECIFIED VOMITING TYPE: ICD-10-CM

## 2024-09-04 RX ORDER — ONDANSETRON 4 MG/1
4 TABLET, ORALLY DISINTEGRATING ORAL 3 TIMES DAILY PRN
Qty: 90 TABLET | Refills: 0 | Status: SHIPPED | OUTPATIENT
Start: 2024-09-04

## 2024-09-25 DIAGNOSIS — G35 MS (MULTIPLE SCLEROSIS) (MULTI): ICD-10-CM

## 2024-09-25 DIAGNOSIS — K44.9 DIAPHRAGMATIC HERNIA WITHOUT OBSTRUCTION AND WITHOUT GANGRENE: ICD-10-CM

## 2024-09-25 NOTE — TELEPHONE ENCOUNTER
PATIENT IS NO LONGER SEEING THE PAIN MANAGEMENT OFFICE WITH THE [facility]. SHE IS ASKING IF YOU COULD FILL HER PANTOPRAZOLE AND GABAPENTIN

## 2024-09-26 RX ORDER — PANTOPRAZOLE SODIUM 40 MG/1
40 TABLET, DELAYED RELEASE ORAL
Qty: 90 TABLET | Refills: 1 | Status: SHIPPED | OUTPATIENT
Start: 2024-09-26

## 2024-09-26 RX ORDER — GABAPENTIN 800 MG/1
800 TABLET ORAL 4 TIMES DAILY
Qty: 120 TABLET | Refills: 1 | Status: SHIPPED | OUTPATIENT
Start: 2024-09-26

## 2024-12-31 PROBLEM — K21.9 GERD (GASTROESOPHAGEAL REFLUX DISEASE): Status: ACTIVE | Noted: 2021-01-22

## 2024-12-31 PROBLEM — R10.31 SEVERE RIGHT GROIN PAIN: Status: ACTIVE | Noted: 2024-12-31

## 2024-12-31 PROBLEM — T81.9XXA POSTOPERATIVE COMPLICATION: Status: ACTIVE | Noted: 2024-12-31

## 2024-12-31 PROBLEM — M54.9 BACKACHE: Status: ACTIVE | Noted: 2024-12-31

## 2024-12-31 PROBLEM — R19.04 ABDOMINAL WALL MASS OF LEFT LOWER QUADRANT: Status: ACTIVE | Noted: 2024-12-31

## 2024-12-31 PROBLEM — K58.9 IRRITABLE BOWEL SYNDROME: Status: ACTIVE | Noted: 2021-01-22

## 2024-12-31 PROBLEM — R07.89 ATYPICAL CHEST PAIN: Status: ACTIVE | Noted: 2024-12-31

## 2024-12-31 PROBLEM — R29.898 LEG WEAKNESS: Status: ACTIVE | Noted: 2021-04-27

## 2024-12-31 PROBLEM — F41.0 ANXIETY ATTACK: Status: ACTIVE | Noted: 2021-01-22

## 2024-12-31 PROBLEM — G89.18 ACUTE POSTOPERATIVE PAIN: Status: ACTIVE | Noted: 2021-02-12

## 2024-12-31 PROBLEM — F32.A DEPRESSION: Status: ACTIVE | Noted: 2024-12-31

## 2024-12-31 PROBLEM — F17.200 NICOTINE USE DISORDER: Status: ACTIVE | Noted: 2024-02-02

## 2024-12-31 PROBLEM — S52.509A CLOSED FRACTURE OF DISTAL END OF RADIUS: Status: ACTIVE | Noted: 2024-12-31

## 2024-12-31 PROBLEM — M23.91 LOCKING OF RIGHT KNEE: Status: ACTIVE | Noted: 2024-12-31

## 2024-12-31 PROBLEM — R07.89 TIGHT CHEST: Status: ACTIVE | Noted: 2024-12-31

## 2024-12-31 PROBLEM — K08.89 TOOTHACHE: Status: ACTIVE | Noted: 2024-12-31

## 2024-12-31 PROBLEM — S46.919A STRAIN OF SHOULDER: Status: ACTIVE | Noted: 2024-12-31

## 2024-12-31 PROBLEM — R51.9 HEADACHE: Status: ACTIVE | Noted: 2024-12-31

## 2024-12-31 PROBLEM — S77.01XA: Status: ACTIVE | Noted: 2024-12-31

## 2024-12-31 PROBLEM — K52.9 COLITIS: Status: ACTIVE | Noted: 2024-12-31

## 2024-12-31 PROBLEM — S61.411A LACERATION OF RIGHT HAND: Status: ACTIVE | Noted: 2024-12-31

## 2024-12-31 PROBLEM — R10.32 ABDOMINAL PAIN, LEFT LOWER QUADRANT: Status: ACTIVE | Noted: 2024-12-31

## 2024-12-31 PROBLEM — S69.90XA INJURY OF FINGER: Status: ACTIVE | Noted: 2024-12-31

## 2024-12-31 PROBLEM — S46.002A INJURY OF LEFT ROTATOR CUFF: Status: ACTIVE | Noted: 2024-12-31

## 2024-12-31 PROBLEM — M54.42 LUMBAGO WITH SCIATICA, LEFT SIDE: Status: ACTIVE | Noted: 2024-12-31

## 2024-12-31 PROBLEM — F32.9 MAJOR DEPRESSIVE DISORDER, SINGLE EPISODE, UNSPECIFIED: Status: ACTIVE | Noted: 2024-12-31

## 2024-12-31 PROBLEM — N80.109 CHOCOLATE CYST OF OVARY: Status: ACTIVE | Noted: 2024-12-31

## 2024-12-31 PROBLEM — M54.12 LEFT CERVICAL RADICULOPATHY: Status: ACTIVE | Noted: 2024-12-31

## 2024-12-31 PROBLEM — M54.2 NECK PAIN: Status: ACTIVE | Noted: 2024-12-31

## 2024-12-31 PROBLEM — M25.512 PAIN IN LEFT SHOULDER: Status: ACTIVE | Noted: 2024-12-31

## 2024-12-31 PROBLEM — S39.012A BACK STRAIN: Status: ACTIVE | Noted: 2024-12-31

## 2024-12-31 PROBLEM — R22.9 SUBCUTANEOUS MASS: Status: ACTIVE | Noted: 2024-12-31

## 2024-12-31 PROBLEM — G35 MULTIPLE SCLEROSIS EXACERBATION (MULTI): Status: ACTIVE | Noted: 2020-12-02

## 2024-12-31 PROBLEM — S49.92XA INJURY OF LEFT CLAVICLE: Status: ACTIVE | Noted: 2024-12-31

## 2024-12-31 PROBLEM — S69.92XA WRIST INJURY, LEFT, INITIAL ENCOUNTER: Status: ACTIVE | Noted: 2024-12-31

## 2024-12-31 PROBLEM — M75.52 BURSITIS OF LEFT SHOULDER: Status: ACTIVE | Noted: 2024-12-31

## 2024-12-31 PROBLEM — F40.240 CLAUSTROPHOBIA: Status: ACTIVE | Noted: 2024-12-31

## 2024-12-31 PROBLEM — T14.8XXA LOCAL INFECTION OF WOUND: Status: ACTIVE | Noted: 2024-12-31

## 2024-12-31 PROBLEM — M25.461 KNEE EFFUSION, RIGHT: Status: ACTIVE | Noted: 2024-12-31

## 2024-12-31 PROBLEM — R05.9 COUGH: Status: ACTIVE | Noted: 2024-12-31

## 2024-12-31 PROBLEM — K04.7 ABSCESSED TOOTH: Status: ACTIVE | Noted: 2024-12-31

## 2024-12-31 PROBLEM — S80.01XA CONTUSION OF RIGHT KNEE: Status: ACTIVE | Noted: 2024-12-31

## 2024-12-31 PROBLEM — K52.831 COLLAGENOUS COLITIS: Status: ACTIVE | Noted: 2021-01-22

## 2024-12-31 PROBLEM — R10.9 ABDOMINAL PAIN: Status: ACTIVE | Noted: 2024-12-31

## 2024-12-31 PROBLEM — M99.04 SOMATIC DYSFUNCTION OF SACRAL REGION: Status: ACTIVE | Noted: 2024-12-31

## 2024-12-31 PROBLEM — J10.1 INFLUENZA A: Status: ACTIVE | Noted: 2024-02-01

## 2024-12-31 PROBLEM — N93.9 VAGINAL BLEEDING: Status: ACTIVE | Noted: 2024-12-31

## 2024-12-31 PROBLEM — S02.5XXA FRACTURE OF TOOTH: Status: ACTIVE | Noted: 2024-12-31

## 2024-12-31 PROBLEM — M79.7 FIBROMYALGIA: Status: ACTIVE | Noted: 2021-01-22

## 2024-12-31 PROBLEM — M53.3 SACROILIAC JOINT DYSFUNCTION OF BOTH SIDES: Status: ACTIVE | Noted: 2024-12-31

## 2024-12-31 PROBLEM — G47.00 INSOMNIA: Status: ACTIVE | Noted: 2024-12-31

## 2024-12-31 PROBLEM — M79.603 PAIN IN UPPER LIMB: Status: ACTIVE | Noted: 2024-12-31

## 2024-12-31 PROBLEM — L08.9 LOCAL INFECTION OF WOUND: Status: ACTIVE | Noted: 2024-12-31

## 2024-12-31 PROBLEM — G43.519 MIGRAINE AURA, PERSISTENT, INTRACTABLE: Status: ACTIVE | Noted: 2024-12-31

## 2024-12-31 PROBLEM — S50.10XA CONTUSION OF FOREARM: Status: ACTIVE | Noted: 2024-12-31

## 2024-12-31 PROBLEM — M65.4 DE QUERVAIN'S TENOSYNOVITIS: Status: ACTIVE | Noted: 2024-12-31

## 2025-01-14 ENCOUNTER — APPOINTMENT (OUTPATIENT)
Dept: PAIN MEDICINE | Facility: CLINIC | Age: 40
End: 2025-01-14

## 2025-02-18 ENCOUNTER — APPOINTMENT (OUTPATIENT)
Dept: PAIN MEDICINE | Facility: CLINIC | Age: 40
End: 2025-02-18

## 2025-02-24 ENCOUNTER — OFFICE VISIT (OUTPATIENT)
Dept: PRIMARY CARE | Facility: CLINIC | Age: 40
End: 2025-02-24
Payer: COMMERCIAL

## 2025-02-24 VITALS
RESPIRATION RATE: 16 BRPM | WEIGHT: 119 LBS | OXYGEN SATURATION: 97 % | BODY MASS INDEX: 18.68 KG/M2 | DIASTOLIC BLOOD PRESSURE: 62 MMHG | HEIGHT: 67 IN | SYSTOLIC BLOOD PRESSURE: 116 MMHG | HEART RATE: 87 BPM

## 2025-02-24 DIAGNOSIS — F33.1 MODERATE EPISODE OF RECURRENT MAJOR DEPRESSIVE DISORDER: ICD-10-CM

## 2025-02-24 DIAGNOSIS — G43.009 MIGRAINE WITHOUT AURA AND WITHOUT STATUS MIGRAINOSUS, NOT INTRACTABLE: ICD-10-CM

## 2025-02-24 DIAGNOSIS — Z13.6 SCREENING FOR CARDIOVASCULAR CONDITION: ICD-10-CM

## 2025-02-24 DIAGNOSIS — Z00.00 ROUTINE GENERAL MEDICAL EXAMINATION AT A HEALTH CARE FACILITY: Primary | ICD-10-CM

## 2025-02-24 DIAGNOSIS — G35 MS (MULTIPLE SCLEROSIS) (MULTI): ICD-10-CM

## 2025-02-24 DIAGNOSIS — Z13.29 SCREENING FOR HYPOTHYROIDISM: ICD-10-CM

## 2025-02-24 DIAGNOSIS — K52.831 COLLAGENOUS COLITIS: ICD-10-CM

## 2025-02-24 DIAGNOSIS — E78.2 MIXED HYPERLIPIDEMIA: ICD-10-CM

## 2025-02-24 DIAGNOSIS — R73.9 HYPERGLYCEMIA: ICD-10-CM

## 2025-02-24 PROCEDURE — 99395 PREV VISIT EST AGE 18-39: CPT | Performed by: INTERNAL MEDICINE

## 2025-02-24 PROCEDURE — 3008F BODY MASS INDEX DOCD: CPT | Performed by: INTERNAL MEDICINE

## 2025-02-24 RX ORDER — BUDESONIDE 3 MG/1
9 CAPSULE, COATED PELLETS ORAL DAILY
Qty: 270 CAPSULE | Refills: 3 | Status: SHIPPED | OUTPATIENT
Start: 2025-02-24 | End: 2026-02-24

## 2025-02-24 ASSESSMENT — ENCOUNTER SYMPTOMS
SHORTNESS OF BREATH: 0
HEMATURIA: 0
NAUSEA: 0
DYSURIA: 0
HEADACHES: 0
WEAKNESS: 1
DIZZINESS: 0
NERVOUS/ANXIOUS: 0
VOMITING: 0
JOINT SWELLING: 0
FEVER: 0
ABDOMINAL PAIN: 0
LOSS OF SENSATION IN FEET: 0
OCCASIONAL FEELINGS OF UNSTEADINESS: 0
FATIGUE: 1
SINUS PAIN: 0
SLEEP DISTURBANCE: 0
PALPITATIONS: 0
RHINORRHEA: 0
APPETITE CHANGE: 0
DIFFICULTY URINATING: 0
CONSTIPATION: 0
SORE THROAT: 0
CHILLS: 0
DEPRESSION: 0
DIARRHEA: 0
FREQUENCY: 0
COUGH: 0
ARTHRALGIAS: 1

## 2025-02-24 ASSESSMENT — PAIN SCALES - GENERAL: PAINLEVEL_OUTOF10: 7
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
## 2025-03-02 LAB
ALBUMIN SERPL-MCNC: 4.7 G/DL (ref 3.6–5.1)
ALP SERPL-CCNC: 64 U/L (ref 31–125)
ALT SERPL-CCNC: 16 U/L (ref 6–29)
ANION GAP SERPL CALCULATED.4IONS-SCNC: 7 MMOL/L (CALC) (ref 7–17)
AST SERPL-CCNC: 12 U/L (ref 10–30)
BASOPHILS # BLD AUTO: 40 CELLS/UL (ref 0–200)
BASOPHILS NFR BLD AUTO: 0.6 %
BILIRUB SERPL-MCNC: 0.5 MG/DL (ref 0.2–1.2)
BUN SERPL-MCNC: 5 MG/DL (ref 7–25)
CALCIUM SERPL-MCNC: 9.6 MG/DL (ref 8.6–10.2)
CHLORIDE SERPL-SCNC: 103 MMOL/L (ref 98–110)
CHOLEST SERPL-MCNC: 234 MG/DL
CHOLEST/HDLC SERPL: 4.6 (CALC)
CO2 SERPL-SCNC: 31 MMOL/L (ref 20–32)
CREAT SERPL-MCNC: 0.7 MG/DL (ref 0.5–0.97)
EGFRCR SERPLBLD CKD-EPI 2021: 114 ML/MIN/1.73M2
EOSINOPHIL # BLD AUTO: 0 CELLS/UL (ref 15–500)
EOSINOPHIL NFR BLD AUTO: 0 %
ERYTHROCYTE [DISTWIDTH] IN BLOOD BY AUTOMATED COUNT: 12.6 % (ref 11–15)
EST. AVERAGE GLUCOSE BLD GHB EST-MCNC: 111 MG/DL
EST. AVERAGE GLUCOSE BLD GHB EST-SCNC: 6.2 MMOL/L
GLUCOSE SERPL-MCNC: 87 MG/DL (ref 65–99)
HBA1C MFR BLD: 5.5 % OF TOTAL HGB
HCT VFR BLD AUTO: 39.3 % (ref 35–45)
HDLC SERPL-MCNC: 51 MG/DL
HGB BLD-MCNC: 13.1 G/DL (ref 11.7–15.5)
LDLC SERPL CALC-MCNC: 160 MG/DL (CALC)
LYMPHOCYTES # BLD AUTO: 1394 CELLS/UL (ref 850–3900)
LYMPHOCYTES NFR BLD AUTO: 20.8 %
MCH RBC QN AUTO: 31 PG (ref 27–33)
MCHC RBC AUTO-ENTMCNC: 33.3 G/DL (ref 32–36)
MCV RBC AUTO: 93.1 FL (ref 80–100)
MONOCYTES # BLD AUTO: 516 CELLS/UL (ref 200–950)
MONOCYTES NFR BLD AUTO: 7.7 %
NEUTROPHILS # BLD AUTO: 4750 CELLS/UL (ref 1500–7800)
NEUTROPHILS NFR BLD AUTO: 70.9 %
NONHDLC SERPL-MCNC: 183 MG/DL (CALC)
PLATELET # BLD AUTO: 282 THOUSAND/UL (ref 140–400)
PMV BLD REES-ECKER: 10.2 FL (ref 7.5–12.5)
POTASSIUM SERPL-SCNC: 4 MMOL/L (ref 3.5–5.3)
PROT SERPL-MCNC: 6.7 G/DL (ref 6.1–8.1)
RBC # BLD AUTO: 4.22 MILLION/UL (ref 3.8–5.1)
SODIUM SERPL-SCNC: 141 MMOL/L (ref 135–146)
T4 FREE SERPL-MCNC: 1.1 NG/DL (ref 0.8–1.8)
TRIGL SERPL-MCNC: 116 MG/DL
TSH SERPL-ACNC: 0.36 MIU/L
WBC # BLD AUTO: 6.7 THOUSAND/UL (ref 3.8–10.8)

## 2025-03-02 RX ORDER — ATORVASTATIN CALCIUM 10 MG/1
10 TABLET, FILM COATED ORAL DAILY
Qty: 100 TABLET | Refills: 3 | Status: SHIPPED | OUTPATIENT
Start: 2025-03-02 | End: 2026-04-06

## 2025-03-02 NOTE — RESULT ENCOUNTER NOTE
Labs okay except cholesterol is high, needs to follow low-fat diet and start low-dose statin, I sent prescription

## 2025-03-25 ENCOUNTER — HOSPITAL ENCOUNTER (EMERGENCY)
Facility: HOSPITAL | Age: 40
Discharge: HOME | End: 2025-03-25
Attending: EMERGENCY MEDICINE
Payer: COMMERCIAL

## 2025-03-25 ENCOUNTER — APPOINTMENT (OUTPATIENT)
Dept: RADIOLOGY | Facility: HOSPITAL | Age: 40
End: 2025-03-25
Payer: COMMERCIAL

## 2025-03-25 VITALS
HEART RATE: 67 BPM | TEMPERATURE: 99 F | WEIGHT: 117.28 LBS | OXYGEN SATURATION: 100 % | BODY MASS INDEX: 18.41 KG/M2 | SYSTOLIC BLOOD PRESSURE: 126 MMHG | DIASTOLIC BLOOD PRESSURE: 87 MMHG | HEIGHT: 67 IN | RESPIRATION RATE: 16 BRPM

## 2025-03-25 DIAGNOSIS — S09.90XA CLOSED HEAD INJURY, INITIAL ENCOUNTER: ICD-10-CM

## 2025-03-25 DIAGNOSIS — R93.0 ABNORMAL CT OF THE HEAD: ICD-10-CM

## 2025-03-25 DIAGNOSIS — S00.31XA ABRASION OF NOSE, INITIAL ENCOUNTER: Primary | ICD-10-CM

## 2025-03-25 PROCEDURE — 99284 EMERGENCY DEPT VISIT MOD MDM: CPT | Mod: 25 | Performed by: EMERGENCY MEDICINE

## 2025-03-25 PROCEDURE — 72125 CT NECK SPINE W/O DYE: CPT

## 2025-03-25 PROCEDURE — 70450 CT HEAD/BRAIN W/O DYE: CPT

## 2025-03-25 PROCEDURE — 96372 THER/PROPH/DIAG INJ SC/IM: CPT | Performed by: CLINICAL NURSE SPECIALIST

## 2025-03-25 PROCEDURE — 2500000001 HC RX 250 WO HCPCS SELF ADMINISTERED DRUGS (ALT 637 FOR MEDICARE OP): Performed by: CLINICAL NURSE SPECIALIST

## 2025-03-25 PROCEDURE — 2500000004 HC RX 250 GENERAL PHARMACY W/ HCPCS (ALT 636 FOR OP/ED): Performed by: CLINICAL NURSE SPECIALIST

## 2025-03-25 PROCEDURE — 70486 CT MAXILLOFACIAL W/O DYE: CPT

## 2025-03-25 PROCEDURE — 76377 3D RENDER W/INTRP POSTPROCES: CPT

## 2025-03-25 RX ORDER — DIPHENHYDRAMINE HCL 25 MG
25 TABLET ORAL ONCE
Status: COMPLETED | OUTPATIENT
Start: 2025-03-25 | End: 2025-03-25

## 2025-03-25 RX ORDER — KETOROLAC TROMETHAMINE 30 MG/ML
30 INJECTION, SOLUTION INTRAMUSCULAR; INTRAVENOUS ONCE
Status: COMPLETED | OUTPATIENT
Start: 2025-03-25 | End: 2025-03-25

## 2025-03-25 RX ORDER — ACETAMINOPHEN 325 MG/1
975 TABLET ORAL ONCE
Status: DISCONTINUED | OUTPATIENT
Start: 2025-03-25 | End: 2025-03-25 | Stop reason: HOSPADM

## 2025-03-25 RX ORDER — METOCLOPRAMIDE 10 MG/1
10 TABLET ORAL ONCE
Status: COMPLETED | OUTPATIENT
Start: 2025-03-25 | End: 2025-03-25

## 2025-03-25 RX ADMIN — KETOROLAC TROMETHAMINE 30 MG: 30 INJECTION, SOLUTION INTRAMUSCULAR at 12:29

## 2025-03-25 RX ADMIN — DIPHENHYDRAMINE HYDROCHLORIDE 25 MG: 25 TABLET ORAL at 12:29

## 2025-03-25 RX ADMIN — METOCLOPRAMIDE 10 MG: 10 TABLET ORAL at 12:29

## 2025-03-25 ASSESSMENT — COLUMBIA-SUICIDE SEVERITY RATING SCALE - C-SSRS
6. HAVE YOU EVER DONE ANYTHING, STARTED TO DO ANYTHING, OR PREPARED TO DO ANYTHING TO END YOUR LIFE?: NO
1. IN THE PAST MONTH, HAVE YOU WISHED YOU WERE DEAD OR WISHED YOU COULD GO TO SLEEP AND NOT WAKE UP?: NO
2. HAVE YOU ACTUALLY HAD ANY THOUGHTS OF KILLING YOURSELF?: NO

## 2025-03-25 ASSESSMENT — PAIN SCALES - GENERAL: PAINLEVEL_OUTOF10: 8

## 2025-03-25 ASSESSMENT — PAIN - FUNCTIONAL ASSESSMENT: PAIN_FUNCTIONAL_ASSESSMENT: 0-10

## 2025-03-25 NOTE — Clinical Note
John Cervantes was seen and treated in our emergency department on 3/25/2025.  She may return to work on 03/25/2025.  No driving or operating machinery received medication in the emergency department.  No lifting or pulling follow-up with Worker's Compensation team/primary care physician in 1 to 2 days for reevaluation     If you have any questions or concerns, please don't hesitate to call.      Zara Jaeger MD

## 2025-03-25 NOTE — Clinical Note
John Cervantes was seen and treated in our emergency department on 3/25/2025.  She may return to work on 03/26/2025.  Follow-up with Worker's Compensation team for reevaluation 1 to 2 days     If you have any questions or concerns, please don't hesitate to call.      Zara Jaeger MD

## 2025-03-25 NOTE — PROGRESS NOTES
Attestation/Supervisory note for JANET Romain      The patient is a 39-year-old female presenting to the emergency department for evaluation of a facial/head injury.  The patient states that she was at work and a lid from an industrial ice machine fell down and hit her in the face.  She states that happened around 0910 this morning.  She states that it stunned her but she did not lose consciousness.  She does have pain but no other symptoms.  No visual changes.  No neck pain or back pain.  No no focal weakness or numbness.  No chest pain or shortness of breath.  No abdominal pain.  No nausea vomiting.  No diarrhea or constipation.  No urinary complaints.  No use of any blood thinners.  All pertinent positives and negatives are recorded above.  All other systems reviewed and otherwise negative.  Vital signs with diastolic hypertension but otherwise within normal limits.  Physical exam with a well-nourished well-developed female in no acute distress.  HEENT exam within abrasion to the nasal bridge with tenderness to palpation and ecchymosis at the site.  She does not have any septal hematomas.  No hemotympanum.  No gross motor, neurologic or vascular episodes on exam.  No midline neck or back pain with palpation.  No step-offs.  She does not have any evidence of airway compromise or respiratory distress.  Abdominal exam is benign.  She is able to walk and stand without difficulty.  She is able to converse without difficulty.      Oral acetaminophen ordered for pain      CT head wo IV contrast   Final Result   No CT evidence of acute intracranial injury.        No CT evidence of acute facial bone injury.        No CT evidence of acute cervical spine injury.        Interval decreased size and conspicuity of previously seen   periventricular white matter lesion on the right since prior   comparison head CT.        MACRO:   None             Signed by: Colton Trujillo 3/25/2025 12:08 PM   Dictation workstation:   [id number]       CT cervical spine wo IV contrast   Final Result   No CT evidence of acute intracranial injury.        No CT evidence of acute facial bone injury.        No CT evidence of acute cervical spine injury.        Interval decreased size and conspicuity of previously seen   periventricular white matter lesion on the right since prior   comparison head CT.        MACRO:   None             Signed by: Colton Trujillo 3/25/2025 12:08 PM   Dictation workstation:   [id number]      CT maxillofacial bones wo IV contrast   Final Result   No CT evidence of acute intracranial injury.        No CT evidence of acute facial bone injury.        No CT evidence of acute cervical spine injury.        Interval decreased size and conspicuity of previously seen   periventricular white matter lesion on the right since prior   comparison head CT.        MACRO:   None             Signed by: Colton Trujillo 3/25/2025 12:08 PM   Dictation workstation:   [id number]      CT 3D reconstruction   Final Result   No CT evidence of acute intracranial injury.        No CT evidence of acute facial bone injury.        No CT evidence of acute cervical spine injury.        Interval decreased size and conspicuity of previously seen   periventricular white matter lesion on the right since prior   comparison head CT.        MACRO:   None             Signed by: Colton Trujillo 3/25/2025 12:08 PM   Dictation workstation:   [id number]           The patient does not have any gross motor, neurologic or vascular episodes on exam.  A visible or palpable bony deformities.  No evidence of intracranial hemorrhage, mass effect or CVA or skull fracture on CT head.  No evidence of fracture or dislocation on CT C-spine.  No evidence of fracture or dislocation on CT maxillofacial.      Tetanus was ordered but the patient declined.  The patient did report persistent pain and Reglan and Benadryl were ordered by JANET Hoyos as the patient did agree to have her daughter pick her  up..  The patient reportedly became argumentative and was cursing at nursing staff demanding that she receive stronger pain medication.  An ice pack was also applied.      The patient was released in good condition.  She was instructed to follow-up with her primary care physician within 1 to 2 days for repeat check of her blood pressure..  She will follow-up with the your Worker's Compensation provider within 1 to 2 days for further management of her injury.  She will return to the department sooner with worsening of symptoms or onset of new symptoms      Impression/diagnosis:  Head injury  Nasal bridge contusion  Diastolic hypertension      I personally saw the patient and made/approve the management plan and take responsibility for the patient management.      I personally discussed the patient's management with the patient      I reviewed the results of the diagnostic imaging.  Formal radiology read was completed by the radiologist.      Zara Jaeger MD

## 2025-03-25 NOTE — DISCHARGE INSTRUCTIONS
Change positions slowly  Brain rest.  Limit your TV time and screen time.  Sit in a quiet room a couple hours a day to allow the brain to rest and heal  Triple antibiotic ointment to the nasal abrasion.  Tetanus shot updated today  Try scheduling Tylenol to help with pain do not go the recommended daily dosage.  Dose given in the emergency department.  Also received Reglan and Benadryl.  To help with headache and Toradol.  Follow-up with your Worker's Compensation team for reevaluation within 1 to 2 days  Return with any worsening symptoms or concerns  Follow-up with neurology for reevaluation for concussion

## 2025-03-25 NOTE — ED PROVIDER NOTES
Department of Emergency Medicine   ED  Provider Note  Admit Date/RoomTime: 3/25/2025 11:08 AM  ED Room: ST26/ST26        History of Present Illness:  Chief Complaint   Patient presents with    Facial Injury     Pt was at work and got hit in the face with a door.  No loc.  No blood thinners.  Complains of facial pain, headache, neck pain.         John Cervantes is a 39 y.o. female presented to the emergency department for evaluation of head injury.  Patient reports she was at work when she lifted the lid of the ice been was not told that the latch was loose or broken and the lid came down and hit her in forehead slid down her face and across her nose and knocked her glasses off.  She has an abrasion to the top of her nose and complaints of a headache.  And neck pain and it hurts to open and shut her jaw.  She denies any fever or chills.  No cough congestion runny nose sore throat no abdominal pain denies difficulty moving her arms or legs.  She is on no blood thinners.  Complains of a severe headache and she is light sensitive.  Reports history of migraines presents now for evaluation    Review of Systems:   Pertinent positives and negatives are stated within HPI, all other systems reviewed and are negative.        --------------------------------------------- PAST HISTORY ---------------------------------------------  Past Medical History:  has a past medical history of Abnormal results of thyroid function studies (01/03/2017), Acute upper respiratory infection, unspecified (01/27/2017), Anxiety disorder, unspecified (06/08/2017), Colitis, Diarrhea, unspecified (10/23/2017), Encounter for follow-up examination after completed treatment for conditions other than malignant neoplasm (01/19/2017), Encounter for screening for other viral diseases (06/08/2017), GERD (gastroesophageal reflux disease), IBS (irritable bowel syndrome), MS (multiple sclerosis) (Multi), Muscle spasm of back (03/14/2017), Other chest pain  (10/23/2017), Other enthesopathies, not elsewhere classified (2017), Other hypertrophic disorders of the skin (2016), Other muscle spasm (2017), Other noninflammatory disorders of ovary, fallopian tube and broad ligament (10/27/2017), Pain in right hip (2017), Pain in right shoulder (10/20/2016), Pain in thoracic spine (2017), Pain in unspecified knee (2016), Personal history of other diseases of the female genital tract (10/27/2017), Personal history of other diseases of the musculoskeletal system and connective tissue (2017), Personal history of other diseases of the respiratory system (2017), Personal history of other diseases of the respiratory system (2016), Personal history of other diseases of the respiratory system (2014), Personal history of other specified conditions, Personal history of other specified conditions (10/23/2017), Personal history of urinary (tract) infections (2016), Personal history of urinary (tract) infections (2017), Pleurodynia (2017), Sebaceous cyst (2014), and Unspecified perforation of tympanic membrane, right ear (2018).  Past Surgical History:  has a past surgical history that includes  section, classic (10/10/2014); Hysterectomy (2018); Other surgical history (2016); and Cervical spine surgery (2021).  Social History:  reports that she has quit smoking. Her smoking use included cigarettes. She has never been exposed to tobacco smoke. She has never used smokeless tobacco. She reports that she does not currently use alcohol. She reports that she does not use drugs.  Family History: family history includes Alcohol abuse in her mother; Diabetes in her father; Hypertension in her father.. Unless otherwise noted, family history is non contributory  The patient’s home medications have been reviewed.  Allergies: Patient has no known  "allergies.        ---------------------------------------------------PHYSICAL EXAM--------------------------------------    GENERAL APPEARANCE: Awake and alert.   VITAL SIGNS: As per the nurses' triage record.   HEENT: Normocephalic, abrasion noted to the bridge of the nose.  No raccoon eyes or felipe signs noted.  No epistaxis noted.  No bite to the tongue or lip.  No hemotympanum noted.  No septal hematoma noted.  Extraocular muscles are intact. Pupils equal round and reactive to light. Conjunctiva are pink. Negative scleral icterus. Mucous membranes are moist. Tongue in the midline. Pharynx was without erythema or exudates, uvula midline.  Light sensitive  NECK: Soft Nontender and supple, full gross ROM, no meningeal signs.  No pain palpation of the cervical spine no step-offs crepitus or bruising  CHEST: Nontender to palpation. Clear to auscultation bilaterally. No rales, rhonchi, or wheezing.   HEART: S1, S2. Regular rate and rhythm. No murmurs, gallops or rubs.  Strong and equal pulses in the extremities.   ABDOMEN: Soft, nontender, nondistended, positive bowel sounds, no palpable masses.  MUSCULCSKELETAL: Full gross active range of motion. Ambulating on own with no acute difficulties  NEUROLOGICAL: Awake, alert and oriented x 3. Power intact in the upper and lower extremities. Sensation is intact to light touch in the upper and lower extremities.  NIH 0.  Test of skew negative Van negative  IMMUNOLOGICAL: No lymphatic streaking noted   DERM: No petechiae, or ecchymoses.          ------------------------- NURSING NOTES AND VITALS REVIEWED ---------------------------  The nursing notes within the ED encounter and vital signs as below have been reviewed by myself  /87   Pulse 67   Temp 37.2 °C (99 °F)   Resp 16   Ht 1.702 m (5' 7\")   Wt 53.2 kg (117 lb 4.6 oz)   LMP  (LMP Unknown)   SpO2 100%   BMI 18.37 kg/m²     Oxygen Saturation Interpretation: 100% room air    The patient’s available past " medical records and past encounters were reviewed.          -----------------------DIAGNOSTIC RESULTS------------------------  LABS:    Labs Reviewed - No data to display    As interpreted by me, the above displayed labs are abnormal. All other labs obtained during this visit were within normal range or not returned as of this dictation.  CT head wo IV contrast   Final Result   No CT evidence of acute intracranial injury.        No CT evidence of acute facial bone injury.        No CT evidence of acute cervical spine injury.        Interval decreased size and conspicuity of previously seen   periventricular white matter lesion on the right since prior   comparison head CT.        MACRO:   None             Signed by: Colton Trujillo 3/25/2025 12:08 PM   Dictation workstation:   [id number]      CT cervical spine wo IV contrast   Final Result   No CT evidence of acute intracranial injury.        No CT evidence of acute facial bone injury.        No CT evidence of acute cervical spine injury.        Interval decreased size and conspicuity of previously seen   periventricular white matter lesion on the right since prior   comparison head CT.        MACRO:   None             Signed by: Colton Trujillo 3/25/2025 12:08 PM   Dictation workstation:   [id number]      CT maxillofacial bones wo IV contrast   Final Result   No CT evidence of acute intracranial injury.        No CT evidence of acute facial bone injury.        No CT evidence of acute cervical spine injury.        Interval decreased size and conspicuity of previously seen   periventricular white matter lesion on the right since prior   comparison head CT.        MACRO:   None             Signed by: Colton Trujillo 3/25/2025 12:08 PM   Dictation workstation:   [id number]      CT 3D reconstruction   Final Result   No CT evidence of acute intracranial injury.        No CT evidence of acute facial bone injury.        No CT evidence of acute cervical spine injury.         Interval decreased size and conspicuity of previously seen   periventricular white matter lesion on the right since prior   comparison head CT.        MACRO:   None             Signed by: Colton Trujillo 3/25/2025 12:08 PM   Dictation workstation:   [id number]              CT head wo IV contrast   Final Result   No CT evidence of acute intracranial injury.        No CT evidence of acute facial bone injury.        No CT evidence of acute cervical spine injury.        Interval decreased size and conspicuity of previously seen   periventricular white matter lesion on the right since prior   comparison head CT.        MACRO:   None             Signed by: Colton Trujillo 3/25/2025 12:08 PM   Dictation workstation:   [id number]      CT cervical spine wo IV contrast   Final Result   No CT evidence of acute intracranial injury.        No CT evidence of acute facial bone injury.        No CT evidence of acute cervical spine injury.        Interval decreased size and conspicuity of previously seen   periventricular white matter lesion on the right since prior   comparison head CT.        MACRO:   None             Signed by: Colton Trujillo 3/25/2025 12:08 PM   Dictation workstation:   [id number]      CT maxillofacial bones wo IV contrast   Final Result   No CT evidence of acute intracranial injury.        No CT evidence of acute facial bone injury.        No CT evidence of acute cervical spine injury.        Interval decreased size and conspicuity of previously seen   periventricular white matter lesion on the right since prior   comparison head CT.        MACRO:   None             Signed by: Colton Trujillo 3/25/2025 12:08 PM   Dictation workstation:   [id number]      CT 3D reconstruction   Final Result   No CT evidence of acute intracranial injury.        No CT evidence of acute facial bone injury.        No CT evidence of acute cervical spine injury.        Interval decreased size and conspicuity of previously seen    periventricular white matter lesion on the right since prior   comparison head CT.        MACRO:   None             Signed by: Colton Trujillo 3/25/2025 12:08 PM   Dictation workstation:   [id number]              ------------------------------ ED COURSE/MEDICAL DECISION MAKING----------------------  Medical Decision Making:   Exam: A medically appropriate exam performed, outlined above, given the known history and presentation.    History obtained from: Review of medical record nursing notes patient      Social Determinants of Health considered during this visit: Injury occurred at work.      PAST MEDICAL HISTORY/Chronic Conditions Affecting Care     has a past medical history of Abnormal results of thyroid function studies (01/03/2017), Acute upper respiratory infection, unspecified (01/27/2017), Anxiety disorder, unspecified (06/08/2017), Colitis, Diarrhea, unspecified (10/23/2017), Encounter for follow-up examination after completed treatment for conditions other than malignant neoplasm (01/19/2017), Encounter for screening for other viral diseases (06/08/2017), GERD (gastroesophageal reflux disease), IBS (irritable bowel syndrome), MS (multiple sclerosis) (Multi), Muscle spasm of back (03/14/2017), Other chest pain (10/23/2017), Other enthesopathies, not elsewhere classified (01/03/2017), Other hypertrophic disorders of the skin (01/27/2016), Other muscle spasm (03/14/2017), Other noninflammatory disorders of ovary, fallopian tube and broad ligament (10/27/2017), Pain in right hip (11/02/2017), Pain in right shoulder (10/20/2016), Pain in thoracic spine (09/19/2017), Pain in unspecified knee (04/18/2016), Personal history of other diseases of the female genital tract (10/27/2017), Personal history of other diseases of the musculoskeletal system and connective tissue (03/14/2017), Personal history of other diseases of the respiratory system (01/27/2017), Personal history of other diseases of the respiratory  system (01/13/2016), Personal history of other diseases of the respiratory system (11/11/2014), Personal history of other specified conditions, Personal history of other specified conditions (10/23/2017), Personal history of urinary (tract) infections (01/05/2016), Personal history of urinary (tract) infections (06/19/2017), Pleurodynia (01/19/2017), Sebaceous cyst (05/12/2014), and Unspecified perforation of tympanic membrane, right ear (01/25/2018).       CC/HPI Summary, Social Determinants of health, Records Reviewed, DDx, testing done/not done, ED Course, Reassessment, disposition considerations/shared decision making with patient, consults, disposition:   Patient presents with abrasion to the nose close head injury status post being hit in the head with a ice bin lid.  While at work  Patient states she was drove her by her daughter  Plan  CT cervical, CT head, CT maxillofacial, Tylenol, Toradol, Reglan, Benadryl    Medical Decision Making/Differential Diagnosis:  Frontals include but not limited to closed head injury versus intracranial bleed versus concussion versus fracture patient is neurologically intact.  NIH 0.  Test of skew negative Van negative.  CT of the head face and neck showedNo CT evidence of acute intracranial injury. No CT evidence of acute facial bone injury. No CT evidence of acute cervical spine injury.Interval decreased size and conspicuity of previously seen periventricular white matter lesion on the right since prior comparison head CT.  While evaluating patient patient reported that her CT results had returned I did not review them told her I was going to back and reviewed the CT and come back and talk with her.  Patient was requesting additional pain medication other than the Tylenol was provided to her earlier.  Benadryl Reglan and Toradol was ordered.  Patient became very angry aggressive swearing at the staff attending physician went in to talk to patient.  Patient did initially  refused the medications but then took the medications refused her tetanus shot.  Patient left the emergency department before I could go back in and talk to her about her CT results.  However she did see the attending physician.  Patient is neurologically intact.  Moving all extremities ambulates with no difficulty.  Based on her clinical presentation history and symptoms consistent with  Abrasion to the nose, close head injury status post injury her Worker's Comp. papers were completed also incidental findings noted on CT patient was given referral to neurology unsure if she waited to get the referral before leaving the emergency department angry.  Patient seen and evaluated with attending physician Dr. Jaeger       PROCEDURES  Unless otherwise noted below, none      CONSULTS:   None      ED Course as of 03/25/25 1726   Tue Mar 25, 2025   1245 After evaluation CT results were noted to be read by the patient I did advise her that I did not see them before he came into the room.  Requesting additional pain medication.  Reglan Benadryl and Toradol ordered.  Was notified by nursing that patient came very aggressive swearing at the nurses.  Patient was then evaluated by the attending physician.  Refused tetanus shot.  Initially refused medications.  Reported that her daughter drove today and she would pick her up.  Per nursing patient's took medication and stormed out of the room very aggressive. [TB]      ED Course User Index  [TB] Sujey Hoyos, APRN-CNP         Diagnoses as of 03/25/25 1726   Abrasion of nose, initial encounter   Abnormal CT of the head   Closed head injury, initial encounter         This patient has remained hemodynamically stable during their ED course.      Critical Care: none        Counseling:  The emergency provider has spoken with the patient and discussed today’s results, in addition to providing specific details for the plan of care and counseling regarding the diagnosis and prognosis.   Questions are answered at this time and they are agreeable with the plan.         --------------------------------- IMPRESSION AND DISPOSITION ---------------------------------    IMPRESSION  1. Abrasion of nose, initial encounter    2. Abnormal CT of the head    3. Closed head injury, initial encounter        DISPOSITION  Disposition: Discharge home  Patient condition is stable        NOTE: This report was transcribed using voice recognition software. Every effort was made to ensure accuracy; however, inadvertent computerized transcription errors may be present      Sujey Hoyos, JACKLYN-CNP  03/25/25 2076

## 2025-04-30 ENCOUNTER — OFFICE VISIT (OUTPATIENT)
Dept: PRIMARY CARE | Facility: CLINIC | Age: 40
End: 2025-04-30
Payer: COMMERCIAL

## 2025-04-30 ENCOUNTER — HOSPITAL ENCOUNTER (OUTPATIENT)
Dept: RADIOLOGY | Facility: CLINIC | Age: 40
Discharge: HOME | End: 2025-04-30
Payer: COMMERCIAL

## 2025-04-30 VITALS
HEIGHT: 67 IN | BODY MASS INDEX: 17.27 KG/M2 | OXYGEN SATURATION: 99 % | HEART RATE: 88 BPM | DIASTOLIC BLOOD PRESSURE: 62 MMHG | SYSTOLIC BLOOD PRESSURE: 100 MMHG | WEIGHT: 110 LBS | RESPIRATION RATE: 16 BRPM

## 2025-04-30 DIAGNOSIS — N30.00 ACUTE CYSTITIS WITHOUT HEMATURIA: Primary | ICD-10-CM

## 2025-04-30 DIAGNOSIS — N20.0 KIDNEY STONE: ICD-10-CM

## 2025-04-30 DIAGNOSIS — N30.00 ACUTE CYSTITIS WITHOUT HEMATURIA: ICD-10-CM

## 2025-04-30 DIAGNOSIS — R30.0 DYSURIA: ICD-10-CM

## 2025-04-30 LAB
POC APPEARANCE, URINE: ABNORMAL
POC BILIRUBIN, URINE: NEGATIVE
POC BLOOD, URINE: ABNORMAL
POC COLOR, URINE: ABNORMAL
POC GLUCOSE, URINE: NEGATIVE MG/DL
POC KETONES, URINE: NEGATIVE MG/DL
POC LEUKOCYTES, URINE: ABNORMAL
POC NITRITE,URINE: POSITIVE
POC PH, URINE: 6.5 PH
POC PROTEIN, URINE: ABNORMAL MG/DL
POC SPECIFIC GRAVITY, URINE: 1.01
POC UROBILINOGEN, URINE: 0.2 EU/DL

## 2025-04-30 PROCEDURE — 3008F BODY MASS INDEX DOCD: CPT | Performed by: INTERNAL MEDICINE

## 2025-04-30 PROCEDURE — 99213 OFFICE O/P EST LOW 20 MIN: CPT | Performed by: INTERNAL MEDICINE

## 2025-04-30 PROCEDURE — 81002 URINALYSIS NONAUTO W/O SCOPE: CPT | Performed by: INTERNAL MEDICINE

## 2025-04-30 PROCEDURE — 76770 US EXAM ABDO BACK WALL COMP: CPT

## 2025-04-30 PROCEDURE — 76770 US EXAM ABDO BACK WALL COMP: CPT | Performed by: RADIOLOGY

## 2025-04-30 RX ORDER — CEPHALEXIN 500 MG/1
500 CAPSULE ORAL 2 TIMES DAILY
Qty: 14 CAPSULE | Refills: 0 | Status: SHIPPED | OUTPATIENT
Start: 2025-04-30 | End: 2025-05-07

## 2025-04-30 ASSESSMENT — ENCOUNTER SYMPTOMS
CONSTIPATION: 0
PALPITATIONS: 0
DEPRESSION: 0
LOSS OF SENSATION IN FEET: 0
BACK PAIN: 1
OCCASIONAL FEELINGS OF UNSTEADINESS: 0
NAUSEA: 0
FREQUENCY: 1
DIZZINESS: 0
ABDOMINAL PAIN: 1
ARTHRALGIAS: 1
DYSURIA: 1
COUGH: 0
SHORTNESS OF BREATH: 0
DIARRHEA: 0

## 2025-04-30 ASSESSMENT — PATIENT HEALTH QUESTIONNAIRE - PHQ9
SUM OF ALL RESPONSES TO PHQ9 QUESTIONS 1 AND 2: 0
2. FEELING DOWN, DEPRESSED OR HOPELESS: NOT AT ALL
1. LITTLE INTEREST OR PLEASURE IN DOING THINGS: NOT AT ALL

## 2025-04-30 ASSESSMENT — PAIN SCALES - GENERAL: PAINLEVEL_OUTOF10: 8

## 2025-04-30 NOTE — PROGRESS NOTES
"Subjective   Patient ID: John Cervantes is a 40 y.o. female who presents for UTI.    For the past couple of days, patient has been experiencing burning with urination, frequency and cloudy urine. Pshx of hysterectomy. Also, for the past couple of months, has been getting a lot of upper and lower back pain.     Diagnostics Reviewed:  Labs Reviewed: 3/1/2025 Blood Work: cholesterol 234 and .         4/30/2025 POCT UA: revealed nitrites, large leukocytes and protein in urine.          Review of Systems   Respiratory:  Negative for cough and shortness of breath.    Cardiovascular:  Negative for chest pain and palpitations.   Gastrointestinal:  Positive for abdominal pain. Negative for constipation, diarrhea and nausea.   Genitourinary:  Positive for dysuria and frequency.        Cloudy urine   Musculoskeletal:  Positive for arthralgias (L hip) and back pain.   Neurological:  Negative for dizziness.     Objective   /62   Pulse 88   Resp 16   Ht 1.702 m (5' 7\")   Wt 49.9 kg (110 lb)   LMP  (LMP Unknown)   SpO2 99%   BMI 17.23 kg/m²     Physical Exam  Cardiovascular:      Rate and Rhythm: Normal rate and regular rhythm.      Heart sounds: Normal heart sounds.   Pulmonary:      Breath sounds: Normal breath sounds.   Abdominal:      Palpations: Abdomen is soft. There is no hepatomegaly.      Tenderness: There is no abdominal tenderness.   Musculoskeletal:      Thoracic back: Tenderness (L of thoracic) present.   Neurological:      Mental Status: She is alert and oriented to person, place, and time.      Gait: Gait normal.   Psychiatric:         Mood and Affect: Mood normal.         Behavior: Behavior normal.       Assessment/Plan   Diagnoses and all orders for this visit:  Acute cystitis without hematuria  -     US renal complete; Future  -     cephalexin (Keflex) 500 mg capsule; Take 1 capsule (500 mg) by mouth 2 times a day for 7 days.  Dysuria  -     POCT UA (nonautomated) manually resulted  Kidney " stone  -     US renal complete; Future    Scribe Attestation  By signing my name below, I, Major Chairez, Scrferoz   attest that this documentation has been prepared under the direction and in the presence of Malia Nathan MD.

## 2025-05-02 ENCOUNTER — TELEPHONE (OUTPATIENT)
Dept: UROLOGY | Facility: CLINIC | Age: 40
End: 2025-05-02
Payer: COMMERCIAL

## 2025-05-02 NOTE — RESULT ENCOUNTER NOTE
Ultrasound of the kidneys shows small nonobstructing kidney stones bilaterally, if this continues to cause her pain she needs to see urologist, please refer her to Dr. Valiente

## 2025-05-07 ENCOUNTER — TELEMEDICINE (OUTPATIENT)
Dept: UROLOGY | Facility: CLINIC | Age: 40
End: 2025-05-07
Payer: COMMERCIAL

## 2025-05-07 ENCOUNTER — HOSPITAL ENCOUNTER (OUTPATIENT)
Dept: RADIOLOGY | Facility: HOSPITAL | Age: 40
Discharge: HOME | End: 2025-05-07
Payer: COMMERCIAL

## 2025-05-07 DIAGNOSIS — N20.0 NEPHROLITHIASIS: Primary | ICD-10-CM

## 2025-05-07 DIAGNOSIS — N20.0 NEPHROLITHIASIS: ICD-10-CM

## 2025-05-07 DIAGNOSIS — R10.9 FLANK PAIN: ICD-10-CM

## 2025-05-07 DIAGNOSIS — N31.9 NEUROGENIC BLADDER: ICD-10-CM

## 2025-05-07 PROCEDURE — 99204 OFFICE O/P NEW MOD 45 MIN: CPT | Performed by: UROLOGY

## 2025-05-07 PROCEDURE — 74176 CT ABD & PELVIS W/O CONTRAST: CPT

## 2025-05-07 NOTE — PROGRESS NOTES
Subjective     This visit was completed via telemedicine. All issues as below were discussed and addressed but no physical exam was performed unless allowed by visual confirmation. If it was felt that the patient should be evaluated in clinic, then they were directed there. Patient verbally consented to visit.      John Cervantes is a 40 y.o. female presenting as a new patient today, kindly referred by Dr. Nathan due to renal stones. Patient has history of nephrolithiasis not requiring surgical intervention. She underwent a renal US ordered by her PCP due to recurrent UTI's which showed small nonobstructing renal calculi bilaterally. Patient has complaints of left flank pain radiating to her abdomen. She has history of MS and reports sensation of incomplete bladder emptying and discomfort after she voids. Denies any recent gross hematuria, fevers, chills, nausea or vomiting.              Medical History[1]  Surgical History[2]  Family History[3]  Current Medications[4]  Allergies[5]  Social History     Socioeconomic History    Marital status:      Spouse name: Not on file    Number of children: Not on file    Years of education: Not on file    Highest education level: Not on file   Occupational History    Not on file   Tobacco Use    Smoking status: Former     Types: Cigarettes     Passive exposure: Never    Smokeless tobacco: Never   Vaping Use    Vaping status: Every Day    Substances: Nicotine   Substance and Sexual Activity    Alcohol use: Not Currently    Drug use: Never    Sexual activity: Yes   Other Topics Concern    Not on file   Social History Narrative    Not on file     Social Drivers of Health     Financial Resource Strain: Not on file   Food Insecurity: Not on file   Transportation Needs: Not on file   Physical Activity: Not on file   Stress: Not on file   Social Connections: Not on file   Intimate Partner Violence: Not on file   Housing Stability: Not on file       Review of Systems  Pertinent  items are noted in HPI.    Objective       Lab Review  Lab Results   Component Value Date    WBC 6.7 03/01/2025    RBC 4.22 03/01/2025    HGB 13.1 03/01/2025    HCT 39.3 03/01/2025     03/01/2025      Lab Results   Component Value Date    BUN 5 (L) 03/01/2025    CREATININE 0.70 03/01/2025              Assessment/Plan   Diagnoses and all orders for this visit:  Nephrolithiasis  -     CT abdomen pelvis wo IV contrast; Future  Flank pain  -     CT abdomen pelvis wo IV contrast; Future  Neurogenic bladder      Nephrolithiasis   Left flank pain      I personally and independently reviewed images of the renal US from 4/30/2025 which showed small non obstructing renal calculi bilaterally. No hydronephrosis bilaterally. Small amount of echogenic debris within the urinary bladder.    We will obtain a CT A&P and follow up virtually to review.    History of MS   Neurogenic bladder - sensation of incomplete bladder emptying    Renal US showed debris in the bladder.     We will refer patient to Vero Haney CNP for PVR check and further management.     All questions were answered to the patient's satisfaction. Patient agrees with the plan and wishes to proceed. Follow-up will be scheduled appropriately.       Scribed for Dr. Arthur by Angélica Ulloa. I , Dr Arthur, have personally reviewed and agreed with the information entered by the Virtual Scribe.          [1]   Past Medical History:  Diagnosis Date    Abnormal results of thyroid function studies 01/03/2017    Abnormal thyroid function test    Acute upper respiratory infection, unspecified 01/27/2017    Acute upper respiratory infection    Anxiety disorder, unspecified 06/08/2017    Acute anxiety    Colitis     Diarrhea, unspecified 10/23/2017    Acute diarrhea    Encounter for follow-up examination after completed treatment for conditions other than malignant neoplasm 01/19/2017    Follow-up examination    Encounter for screening for other viral diseases 06/08/2017     Need for hepatitis C screening test    GERD (gastroesophageal reflux disease)     IBS (irritable bowel syndrome)     MS (multiple sclerosis) (Multi)     Muscle spasm of back 03/14/2017    Back muscle spasm    Other chest pain 10/23/2017    Chest tightness    Other enthesopathies, not elsewhere classified 01/03/2017    Shoulder tendinitis    Other hypertrophic disorders of the skin 01/27/2016    Skin tag    Other muscle spasm 03/14/2017    Neck muscle spasm    Other noninflammatory disorders of ovary, fallopian tube and broad ligament 10/27/2017    Enlarged ovary    Pain in right hip 11/02/2017    Right hip pain    Pain in right shoulder 10/20/2016    Right shoulder pain    Pain in thoracic spine 09/19/2017    Thoracic back pain    Pain in unspecified knee 04/18/2016    Knee pain    Personal history of other diseases of the female genital tract 10/27/2017    History of ovarian cyst    Personal history of other diseases of the musculoskeletal system and connective tissue 03/14/2017    History of low back pain    Personal history of other diseases of the respiratory system 01/27/2017    History of acute sinusitis    Personal history of other diseases of the respiratory system 01/13/2016    History of acute sinusitis    Personal history of other diseases of the respiratory system 11/11/2014    History of sinusitis    Personal history of other specified conditions     History of nausea    Personal history of other specified conditions 10/23/2017    History of dyspnea    Personal history of urinary (tract) infections 01/05/2016    History of urinary tract infection    Personal history of urinary (tract) infections 06/19/2017    History of urinary tract infection    Pleurodynia 01/19/2017    Chest pain, pleuritic    Sebaceous cyst 05/12/2014    Infected sebaceous cyst    Unspecified perforation of tympanic membrane, right ear 01/25/2018    Perforation of right tympanic membrane   [2]   Past Surgical History:  Procedure  Laterality Date    CERVICAL SPINE SURGERY  2021    c5-c6     SECTION, CLASSIC  10/10/2014     Section    HYSTERECTOMY  2018    Hysterectomy    OTHER SURGICAL HISTORY  2016    Abdominal Surgery   [3]   Family History  Problem Relation Name Age of Onset    Alcohol abuse Mother      Hypertension Father      Diabetes Father     [4]   Current Outpatient Medications   Medication Sig Dispense Refill    atorvastatin (Lipitor) 10 mg tablet Take 1 tablet (10 mg) by mouth once daily. 100 tablet 3    budesonide EC (Entocort EC) 3 mg 24 hr capsule Take 3 capsules (9 mg) by mouth once daily. 270 capsule 3    cephalexin (Keflex) 500 mg capsule Take 1 capsule (500 mg) by mouth 2 times a day for 7 days. 14 capsule 0    gabapentin (Neurontin) 800 mg tablet Take 1 tablet (800 mg) by mouth 4 times a day. 120 tablet 1    Kesimpta Pen 20 mg/0.4 mL injection Inject 0.4 mL (20 mg) under the skin every 28 (twenty-eight) days.      lamoTRIgine (LaMICtal) 200 mg tablet Take 1 tablet (200 mg) by mouth once daily.      ondansetron ODT (Zofran-ODT) 4 mg disintegrating tablet Take 1 tablet (4 mg) by mouth 3 times a day as needed for nausea. 90 tablet 0    pantoprazole (ProtoNix) 40 mg EC tablet TAKE 1 TABLET (40 MG) BY MOUTH ONCE DAILY IN THE MORNING. TAKE BEFORE MEALS. 90 tablet 3     No current facility-administered medications for this visit.   [5] No Known Allergies

## 2025-05-12 NOTE — PROGRESS NOTES
Subjective     This visit was completed via telemedicine. All issues as below were discussed and addressed but no physical exam was performed unless allowed by visual confirmation. If it was felt that the patient should be evaluated in clinic, then they were directed there. Patient verbally consented to visit.      John Cervantes is a 40 y.o. female with history of MS, neurogenic bladder and nephrolithiasis; presenting today to review CT A&P. Patient states she is still having persistent flank pain.             LAST VISIT (5/7/2025):  John Cervantes is a 40 y.o. female presenting as a new patient today, kindly referred by Dr. Nathan due to renal stones. Patient has history of nephrolithiasis not requiring surgical intervention. She underwent a renal US ordered by her PCP due to recurrent UTI's which showed small nonobstructing renal calculi bilaterally. Patient has complaints of left flank pain radiating to her abdomen. She has history of MS and reports sensation of incomplete bladder emptying and discomfort after she voids. Denies any recent gross hematuria, fevers, chills, nausea or vomiting.      Medical History[1]  Surgical History[2]  Family History[3]  Current Medications[4]  Allergies[5]  Social History     Socioeconomic History    Marital status:      Spouse name: Not on file    Number of children: Not on file    Years of education: Not on file    Highest education level: Not on file   Occupational History    Not on file   Tobacco Use    Smoking status: Former     Types: Cigarettes     Passive exposure: Never    Smokeless tobacco: Never   Vaping Use    Vaping status: Every Day    Substances: Nicotine   Substance and Sexual Activity    Alcohol use: Not Currently    Drug use: Never    Sexual activity: Yes   Other Topics Concern    Not on file   Social History Narrative    Not on file     Social Drivers of Health     Financial Resource Strain: Not on file   Food Insecurity: Not on file    Transportation Needs: Not on file   Physical Activity: Not on file   Stress: Not on file   Social Connections: Not on file   Intimate Partner Violence: Not on file   Housing Stability: Not on file       Review of Systems  Pertinent items are noted in HPI.    Objective       Lab Review  Lab Results   Component Value Date    WBC 6.7 03/01/2025    RBC 4.22 03/01/2025    HGB 13.1 03/01/2025    HCT 39.3 03/01/2025     03/01/2025      Lab Results   Component Value Date    BUN 5 (L) 03/01/2025    CREATININE 0.70 03/01/2025              Assessment/Plan   Diagnoses and all orders for this visit:  Flank pain  Neurogenic bladder  -     Referral to Urogynecology; Future        Nephrolithiasis   Left flank pain  Neurogenic bladder secondary to MS    I personally and independently reviewed images of the CT A&P from 5/7/2025 which showed no evidence of stones or hydronephrosis bilaterally.  Mild constipation.     I discussed the findings with the patient and reassured her that there is no evidence of stones on CT, therefore, pain is likely not urologic.     Patient appears to be frustrated with lack of answers. I advised her to reach out to her PCP to review all non-urologic findings on her CT.     I also advised her to proceed with evaluation by urogyn due to neurogenic bladder secondary to MS.     Follow up with me as needed.       All questions were answered to the patient's satisfaction. Patient agrees with the plan and wishes to proceed. Follow-up will be scheduled appropriately.     I spent 20 minutes of dedicated E&M time, including preparation and review of records, notes, and data, time spent with patient/family, and documentation.     Scribed for Dr. Arthur by Angélica Ulloa. I , Dr Arthur, have personally reviewed and agreed with the information entered by the Virtual Scribe.          [1]   Past Medical History:  Diagnosis Date    Abnormal results of thyroid function studies 01/03/2017    Abnormal thyroid function  test    Acute upper respiratory infection, unspecified 01/27/2017    Acute upper respiratory infection    Anxiety disorder, unspecified 06/08/2017    Acute anxiety    Colitis     Diarrhea, unspecified 10/23/2017    Acute diarrhea    Encounter for follow-up examination after completed treatment for conditions other than malignant neoplasm 01/19/2017    Follow-up examination    Encounter for screening for other viral diseases 06/08/2017    Need for hepatitis C screening test    GERD (gastroesophageal reflux disease)     IBS (irritable bowel syndrome)     MS (multiple sclerosis) (Multi)     Muscle spasm of back 03/14/2017    Back muscle spasm    Other chest pain 10/23/2017    Chest tightness    Other enthesopathies, not elsewhere classified 01/03/2017    Shoulder tendinitis    Other hypertrophic disorders of the skin 01/27/2016    Skin tag    Other muscle spasm 03/14/2017    Neck muscle spasm    Other noninflammatory disorders of ovary, fallopian tube and broad ligament 10/27/2017    Enlarged ovary    Pain in right hip 11/02/2017    Right hip pain    Pain in right shoulder 10/20/2016    Right shoulder pain    Pain in thoracic spine 09/19/2017    Thoracic back pain    Pain in unspecified knee 04/18/2016    Knee pain    Personal history of other diseases of the female genital tract 10/27/2017    History of ovarian cyst    Personal history of other diseases of the musculoskeletal system and connective tissue 03/14/2017    History of low back pain    Personal history of other diseases of the respiratory system 01/27/2017    History of acute sinusitis    Personal history of other diseases of the respiratory system 01/13/2016    History of acute sinusitis    Personal history of other diseases of the respiratory system 11/11/2014    History of sinusitis    Personal history of other specified conditions     History of nausea    Personal history of other specified conditions 10/23/2017    History of dyspnea    Personal history  of urinary (tract) infections 2016    History of urinary tract infection    Personal history of urinary (tract) infections 2017    History of urinary tract infection    Pleurodynia 2017    Chest pain, pleuritic    Sebaceous cyst 2014    Infected sebaceous cyst    Unspecified perforation of tympanic membrane, right ear 2018    Perforation of right tympanic membrane   [2]   Past Surgical History:  Procedure Laterality Date    CERVICAL SPINE SURGERY  2021    c5-c6     SECTION, CLASSIC  10/10/2014     Section    HYSTERECTOMY  2018    Hysterectomy    OTHER SURGICAL HISTORY  2016    Abdominal Surgery   [3]   Family History  Problem Relation Name Age of Onset    Alcohol abuse Mother      Hypertension Father      Diabetes Father     [4]   Current Outpatient Medications   Medication Sig Dispense Refill    atorvastatin (Lipitor) 10 mg tablet Take 1 tablet (10 mg) by mouth once daily. 100 tablet 3    budesonide EC (Entocort EC) 3 mg 24 hr capsule Take 3 capsules (9 mg) by mouth once daily. 270 capsule 3    gabapentin (Neurontin) 800 mg tablet Take 1 tablet (800 mg) by mouth 4 times a day. 120 tablet 1    Kesimpta Pen 20 mg/0.4 mL injection Inject 0.4 mL (20 mg) under the skin every 28 (twenty-eight) days.      lamoTRIgine (LaMICtal) 200 mg tablet Take 1 tablet (200 mg) by mouth once daily.      ondansetron ODT (Zofran-ODT) 4 mg disintegrating tablet Take 1 tablet (4 mg) by mouth 3 times a day as needed for nausea. 90 tablet 0    pantoprazole (ProtoNix) 40 mg EC tablet TAKE 1 TABLET (40 MG) BY MOUTH ONCE DAILY IN THE MORNING. TAKE BEFORE MEALS. 90 tablet 3     No current facility-administered medications for this visit.   [5] No Known Allergies

## 2025-05-13 ENCOUNTER — APPOINTMENT (OUTPATIENT)
Dept: UROLOGY | Facility: CLINIC | Age: 40
End: 2025-05-13
Payer: COMMERCIAL

## 2025-05-13 DIAGNOSIS — N31.9 NEUROGENIC BLADDER: ICD-10-CM

## 2025-05-13 DIAGNOSIS — R10.9 FLANK PAIN: ICD-10-CM

## 2025-05-13 PROCEDURE — 99213 OFFICE O/P EST LOW 20 MIN: CPT | Performed by: UROLOGY

## 2025-05-14 DIAGNOSIS — R16.0 HEPATOMEGALY: ICD-10-CM

## 2025-05-14 DIAGNOSIS — G89.29 CHRONIC MIDLINE LOW BACK PAIN WITHOUT SCIATICA: ICD-10-CM

## 2025-05-14 DIAGNOSIS — G35 MS (MULTIPLE SCLEROSIS) (MULTI): Primary | ICD-10-CM

## 2025-05-14 DIAGNOSIS — M54.50 CHRONIC MIDLINE LOW BACK PAIN WITHOUT SCIATICA: ICD-10-CM

## 2025-05-16 ENCOUNTER — APPOINTMENT (OUTPATIENT)
Dept: UROLOGY | Facility: CLINIC | Age: 40
End: 2025-05-16
Payer: COMMERCIAL

## 2025-05-16 ENCOUNTER — HOSPITAL ENCOUNTER (OUTPATIENT)
Dept: RADIOLOGY | Facility: HOSPITAL | Age: 40
Discharge: HOME | End: 2025-05-16
Payer: COMMERCIAL

## 2025-05-16 DIAGNOSIS — R16.0 HEPATOMEGALY: ICD-10-CM

## 2025-05-16 PROCEDURE — 76705 ECHO EXAM OF ABDOMEN: CPT

## 2025-06-03 DIAGNOSIS — R10.13 EPIGASTRIC PAIN: Primary | ICD-10-CM

## 2025-06-03 DIAGNOSIS — K52.831 COLLAGENOUS COLITIS: ICD-10-CM

## 2025-06-23 ENCOUNTER — HOSPITAL ENCOUNTER (OUTPATIENT)
Dept: RADIOLOGY | Facility: HOSPITAL | Age: 40
Discharge: HOME | End: 2025-06-23
Payer: COMMERCIAL

## 2025-06-23 DIAGNOSIS — R10.11 RIGHT UPPER QUADRANT PAIN: ICD-10-CM

## 2025-06-23 PROCEDURE — 78227 HEPATOBIL SYST IMAGE W/DRUG: CPT

## 2025-06-23 PROCEDURE — 78227 HEPATOBIL SYST IMAGE W/DRUG: CPT | Performed by: INTERNAL MEDICINE

## 2025-06-23 PROCEDURE — 3430000001 HC RX 343 DIAGNOSTIC RADIOPHARMACEUTICALS: Performed by: INTERNAL MEDICINE

## 2025-06-23 PROCEDURE — A9537 TC99M MEBROFENIN: HCPCS | Performed by: INTERNAL MEDICINE

## 2025-06-23 RX ORDER — KIT FOR THE PREPARATION OF TECHNETIUM TC 99M MEBROFENIN 45 MG/10ML
5.6 INJECTION, POWDER, LYOPHILIZED, FOR SOLUTION INTRAVENOUS
Status: COMPLETED | OUTPATIENT
Start: 2025-06-23 | End: 2025-06-23

## 2025-06-23 RX ORDER — SINCALIDE 5 UG/5ML
1 INJECTION, POWDER, LYOPHILIZED, FOR SOLUTION INTRAVENOUS ONCE
Status: CANCELLED | OUTPATIENT
Start: 2025-06-23 | End: 2025-06-23

## 2025-06-23 RX ADMIN — KIT FOR THE PREPARATION OF TECHNETIUM TC 99M MEBROFENIN 5.6 MILLICURIE: 45 INJECTION, POWDER, LYOPHILIZED, FOR SOLUTION INTRAVENOUS at 07:15
